# Patient Record
Sex: MALE | Race: WHITE | NOT HISPANIC OR LATINO | Employment: OTHER | URBAN - METROPOLITAN AREA
[De-identification: names, ages, dates, MRNs, and addresses within clinical notes are randomized per-mention and may not be internally consistent; named-entity substitution may affect disease eponyms.]

---

## 2017-01-03 ENCOUNTER — LAB CONVERSION - ENCOUNTER (OUTPATIENT)
Dept: OTHER | Facility: OTHER | Age: 52
End: 2017-01-03

## 2017-01-03 LAB
A/G RATIO (HISTORICAL): 1.6 (CALC) (ref 1–2.5)
ALBUMIN SERPL BCP-MCNC: 4.8 G/DL (ref 3.6–5.1)
ALP SERPL-CCNC: 76 U/L (ref 40–115)
ALT SERPL W P-5'-P-CCNC: 40 U/L (ref 9–46)
AST SERPL W P-5'-P-CCNC: 34 U/L (ref 10–35)
BILIRUB SERPL-MCNC: 0.5 MG/DL (ref 0.2–1.2)
BUN SERPL-MCNC: 11 MG/DL (ref 7–25)
BUN/CREA RATIO (HISTORICAL): ABNORMAL (CALC) (ref 6–22)
CALCIUM SERPL-MCNC: 9.7 MG/DL (ref 8.6–10.3)
CHLORIDE SERPL-SCNC: 99 MMOL/L (ref 98–110)
CHOLEST SERPL-MCNC: 224 MG/DL (ref 125–200)
CHOLEST/HDLC SERPL: 3.8 (CALC)
CO2 SERPL-SCNC: 28 MMOL/L (ref 20–31)
CREAT SERPL-MCNC: 0.82 MG/DL (ref 0.7–1.33)
DEPRECATED RDW RBC AUTO: 13.7 % (ref 11–15)
EGFR AFRICAN AMERICAN (HISTORICAL): 117 ML/MIN/1.73M2
EGFR-AMERICAN CALC (HISTORICAL): 101 ML/MIN/1.73M2
GAMMA GLOBULIN (HISTORICAL): 3 G/DL (CALC) (ref 1.9–3.7)
GLUCOSE (HISTORICAL): 100 MG/DL (ref 65–99)
HCT VFR BLD AUTO: 47 % (ref 38.5–50)
HDLC SERPL-MCNC: 59 MG/DL
HGB BLD-MCNC: 15.5 G/DL (ref 13.2–17.1)
HIV AG/AB, 4TH GEN (HISTORICAL): NORMAL
LDL CHOLESTEROL (HISTORICAL): 116 MG/DL (CALC)
MCH RBC QN AUTO: 31.6 PG (ref 27–33)
MCHC RBC AUTO-ENTMCNC: 32.9 G/DL (ref 32–36)
MCV RBC AUTO: 96 FL (ref 80–100)
NON-HDL-CHOL (CHOL-HDL) (HISTORICAL): 165 MG/DL (CALC)
PLATELET # BLD AUTO: 237 THOUSAND/UL (ref 140–400)
PMV BLD AUTO: 8.8 FL (ref 7.5–11.5)
POTASSIUM SERPL-SCNC: 4 MMOL/L (ref 3.5–5.3)
PROSTATE SPECIFIC ANTIGEN TOTAL (HISTORICAL): 0.3 NG/ML
RBC # BLD AUTO: 4.9 MILLION/UL (ref 4.2–5.8)
SODIUM SERPL-SCNC: 136 MMOL/L (ref 135–146)
T4 FREE SERPL-MCNC: 1 NG/DL (ref 0.8–1.8)
TOTAL PROTEIN (HISTORICAL): 7.8 G/DL (ref 6.1–8.1)
TRIGL SERPL-MCNC: 243 MG/DL
VIT B12 SERPL-MCNC: 727 PG/ML (ref 200–1100)
VITAMIN B1, WHOLE BLOOD (HISTORICAL): 124 NMOL/L (ref 78–185)
WBC # BLD AUTO: 7.3 THOUSAND/UL (ref 3.8–10.8)

## 2017-01-14 ENCOUNTER — ALLSCRIPTS OFFICE VISIT (OUTPATIENT)
Dept: OTHER | Facility: OTHER | Age: 52
End: 2017-01-14

## 2017-01-16 ENCOUNTER — GENERIC CONVERSION - ENCOUNTER (OUTPATIENT)
Dept: OTHER | Facility: OTHER | Age: 52
End: 2017-01-16

## 2017-01-23 ENCOUNTER — GENERIC CONVERSION - ENCOUNTER (OUTPATIENT)
Dept: OTHER | Facility: OTHER | Age: 52
End: 2017-01-23

## 2017-01-27 ENCOUNTER — ALLSCRIPTS OFFICE VISIT (OUTPATIENT)
Dept: PSYCHOLOGY | Facility: CLINIC | Age: 52
End: 2017-01-27

## 2017-02-28 ENCOUNTER — ALLSCRIPTS OFFICE VISIT (OUTPATIENT)
Dept: OTHER | Facility: OTHER | Age: 52
End: 2017-02-28

## 2017-02-28 DIAGNOSIS — M54.50 LOW BACK PAIN: ICD-10-CM

## 2017-04-05 ENCOUNTER — GENERIC CONVERSION - ENCOUNTER (OUTPATIENT)
Dept: OTHER | Facility: OTHER | Age: 52
End: 2017-04-05

## 2017-04-12 ENCOUNTER — ALLSCRIPTS OFFICE VISIT (OUTPATIENT)
Dept: OTHER | Facility: OTHER | Age: 52
End: 2017-04-12

## 2017-04-26 ENCOUNTER — GENERIC CONVERSION - ENCOUNTER (OUTPATIENT)
Dept: OTHER | Facility: OTHER | Age: 52
End: 2017-04-26

## 2017-05-03 ENCOUNTER — ALLSCRIPTS OFFICE VISIT (OUTPATIENT)
Dept: OTHER | Facility: OTHER | Age: 52
End: 2017-05-03

## 2017-06-16 ENCOUNTER — GENERIC CONVERSION - ENCOUNTER (OUTPATIENT)
Dept: OTHER | Facility: OTHER | Age: 52
End: 2017-06-16

## 2017-06-21 ENCOUNTER — ALLSCRIPTS OFFICE VISIT (OUTPATIENT)
Dept: OTHER | Facility: OTHER | Age: 52
End: 2017-06-21

## 2017-07-18 ENCOUNTER — ALLSCRIPTS OFFICE VISIT (OUTPATIENT)
Dept: OTHER | Facility: OTHER | Age: 52
End: 2017-07-18

## 2017-07-18 DIAGNOSIS — M25.562 PAIN IN LEFT KNEE: ICD-10-CM

## 2017-07-18 DIAGNOSIS — M25.511 PAIN IN RIGHT SHOULDER: ICD-10-CM
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
## 2017-10-09 ENCOUNTER — TRANSCRIBE ORDERS (OUTPATIENT)
Dept: ADMINISTRATIVE | Facility: HOSPITAL | Age: 52
End: 2017-10-09

## 2017-10-09 ENCOUNTER — GENERIC CONVERSION - ENCOUNTER (OUTPATIENT)
Dept: OTHER | Facility: OTHER | Age: 52
End: 2017-10-09

## 2017-10-09 ENCOUNTER — HOSPITAL ENCOUNTER (OUTPATIENT)
Dept: RADIOLOGY | Facility: HOSPITAL | Age: 52
Discharge: HOME | End: 2017-10-09
Payer: COMMERCIAL

## 2017-10-09 ENCOUNTER — APPOINTMENT (OUTPATIENT)
Dept: LAB | Facility: HOSPITAL | Age: 52
End: 2017-10-09
Payer: COMMERCIAL

## 2017-10-09 DIAGNOSIS — M25.562 PAIN IN LEFT KNEE: ICD-10-CM

## 2017-10-09 DIAGNOSIS — E55.9 AVITAMINOSIS D: ICD-10-CM
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
## 2017-10-09 DIAGNOSIS — E55.9 AVITAMINOSIS D: Primary | ICD-10-CM

## 2017-10-09 DIAGNOSIS — M25.511 PAIN IN RIGHT SHOULDER: ICD-10-CM

## 2017-10-09 LAB — 25(OH)D3 SERPL-MCNC: 37.3 NG/ML (ref 30–100)

## 2017-10-09 PROCEDURE — 73562 X-RAY EXAM OF KNEE 3: CPT

## 2017-10-09 PROCEDURE — 86617 LYME DISEASE ANTIBODY: CPT

## 2017-10-09 PROCEDURE — 82306 VITAMIN D 25 HYDROXY: CPT

## 2017-10-09 PROCEDURE — 73030 X-RAY EXAM OF SHOULDER: CPT

## 2017-10-09 PROCEDURE — 36415 COLL VENOUS BLD VENIPUNCTURE: CPT

## 2017-10-10 ENCOUNTER — GENERIC CONVERSION - ENCOUNTER (OUTPATIENT)
Dept: OTHER | Facility: OTHER | Age: 52
End: 2017-10-10

## 2017-10-10 LAB
B BURGDOR IGG PATRN SER IB-IMP: POSITIVE
B BURGDOR IGM PATRN SER IB-IMP: NEGATIVE
B BURGDOR18KD IGG SER QL IB: PRESENT
B BURGDOR23KD IGG SER QL IB: ABNORMAL
B BURGDOR23KD IGM SER QL IB: ABNORMAL
B BURGDOR28KD IGG SER QL IB: ABNORMAL
B BURGDOR30KD IGG SER QL IB: PRESENT
B BURGDOR39KD IGG SER QL IB: PRESENT
B BURGDOR39KD IGM SER QL IB: ABNORMAL
B BURGDOR41KD IGG SER QL IB: PRESENT
B BURGDOR41KD IGM SER QL IB: ABNORMAL
B BURGDOR45KD IGG SER QL IB: PRESENT
B BURGDOR58KD IGG SER QL IB: PRESENT
B BURGDOR66KD IGG SER QL IB: ABNORMAL
B BURGDOR93KD IGG SER QL IB: ABNORMAL

## 2017-10-17 ENCOUNTER — ALLSCRIPTS OFFICE VISIT (OUTPATIENT)
Dept: OTHER | Facility: OTHER | Age: 52
End: 2017-10-17

## 2017-10-24 RX ORDER — DOXYCYCLINE HYCLATE 100 MG/1
100 CAPSULE ORAL EVERY 12 HOURS SCHEDULED
COMMUNITY
End: 2018-06-04

## 2017-10-24 RX ORDER — MELATONIN
2000 DAILY
COMMUNITY
End: 2021-02-11 | Stop reason: SDUPTHER

## 2017-10-29 ENCOUNTER — ANESTHESIA EVENT (OUTPATIENT)
Dept: PERIOP | Facility: HOSPITAL | Age: 52
End: 2017-10-29
Payer: COMMERCIAL

## 2017-10-29 RX ORDER — SODIUM CHLORIDE, SODIUM LACTATE, POTASSIUM CHLORIDE, CALCIUM CHLORIDE 600; 310; 30; 20 MG/100ML; MG/100ML; MG/100ML; MG/100ML
50 INJECTION, SOLUTION INTRAVENOUS CONTINUOUS
Status: CANCELLED | OUTPATIENT
Start: 2017-10-29

## 2017-10-30 ENCOUNTER — GENERIC CONVERSION - ENCOUNTER (OUTPATIENT)
Dept: OTHER | Facility: OTHER | Age: 52
End: 2017-10-30

## 2017-10-30 ENCOUNTER — ANESTHESIA (OUTPATIENT)
Dept: PERIOP | Facility: HOSPITAL | Age: 52
End: 2017-10-30
Payer: COMMERCIAL

## 2017-10-30 ENCOUNTER — HOSPITAL ENCOUNTER (OUTPATIENT)
Facility: HOSPITAL | Age: 52
Setting detail: OUTPATIENT SURGERY
Discharge: HOME | End: 2017-10-30
Attending: INTERNAL MEDICINE | Admitting: INTERNAL MEDICINE
Payer: COMMERCIAL

## 2017-10-30 VITALS
SYSTOLIC BLOOD PRESSURE: 135 MMHG | HEIGHT: 71 IN | DIASTOLIC BLOOD PRESSURE: 67 MMHG | OXYGEN SATURATION: 98 % | WEIGHT: 197.53 LBS | HEART RATE: 52 BPM | BODY MASS INDEX: 27.65 KG/M2 | TEMPERATURE: 97.7 F | RESPIRATION RATE: 17 BRPM

## 2017-10-30 DIAGNOSIS — Z12.11 ENCOUNTER FOR SCREENING FOR MALIGNANT NEOPLASM OF COLON: ICD-10-CM

## 2017-10-30 PROCEDURE — 88305 TISSUE EXAM BY PATHOLOGIST: CPT | Performed by: INTERNAL MEDICINE

## 2017-10-30 RX ORDER — SODIUM CHLORIDE, SODIUM LACTATE, POTASSIUM CHLORIDE, CALCIUM CHLORIDE 600; 310; 30; 20 MG/100ML; MG/100ML; MG/100ML; MG/100ML
50 INJECTION, SOLUTION INTRAVENOUS CONTINUOUS
Status: DISCONTINUED | OUTPATIENT
Start: 2017-10-30 | End: 2017-10-30 | Stop reason: HOSPADM

## 2017-10-30 RX ORDER — BUPRENORPHINE AND NALOXONE 8; 2 MG/1; MG/1
1 FILM, SOLUBLE BUCCAL; SUBLINGUAL DAILY
COMMUNITY
End: 2018-06-04

## 2017-10-30 RX ORDER — PROPOFOL 10 MG/ML
INJECTION, EMULSION INTRAVENOUS AS NEEDED
Status: DISCONTINUED | OUTPATIENT
Start: 2017-10-30 | End: 2017-10-30 | Stop reason: SURG

## 2017-10-30 RX ADMIN — LIDOCAINE HYDROCHLORIDE 20 MG: 20 INJECTION, SOLUTION INTRAVENOUS at 10:20

## 2017-10-30 RX ADMIN — SODIUM CHLORIDE, SODIUM LACTATE, POTASSIUM CHLORIDE, AND CALCIUM CHLORIDE: .6; .31; .03; .02 INJECTION, SOLUTION INTRAVENOUS at 10:00

## 2017-10-30 RX ADMIN — PROPOFOL 50 MG: 10 INJECTION, EMULSION INTRAVENOUS at 10:23

## 2017-10-30 RX ADMIN — PROPOFOL 50 MG: 10 INJECTION, EMULSION INTRAVENOUS at 10:28

## 2017-10-30 RX ADMIN — PROPOFOL 50 MG: 10 INJECTION, EMULSION INTRAVENOUS at 10:21

## 2017-10-30 RX ADMIN — PROPOFOL 50 MG: 10 INJECTION, EMULSION INTRAVENOUS at 10:20

## 2017-10-30 RX ADMIN — PROPOFOL 50 MG: 10 INJECTION, EMULSION INTRAVENOUS at 10:25

## 2017-10-30 NOTE — ANESTHESIA POSTPROCEDURE EVALUATION
Post-Op Assessment Note      CV Status:  Stable    Mental Status:  Alert    Hydration Status:  Stable    PONV Controlled:  None    Airway Patency:  Patent    Post Op Vitals Reviewed: Yes          Staff: CRNA           BP   128/63   Temp   98 0   Pulse  54   Resp   15   SpO2   97

## 2017-10-30 NOTE — OP NOTE
**** GI/ENDOSCOPY REPORT ****     PATIENT NAME: Sarahi Young ------ VISIT ID:  Patient ID: [id number]   YOB: 1965     INTRODUCTION: Colonoscopy - A 45 male patient presents for an outpatient   Colonoscopy at [facility]  PREVIOUS COLONOSCOPY:     INDICATIONS: Average-risk screening for colon cancer  CONSENT:  The benefits, risks, and alternatives to the procedure were   discussed and informed consent was obtained from the patient  PREPARATION: EKG, pulse, pulse oximetry and blood pressure were monitored   throughout the procedure  The patient was identified by myself both   verbally and by visual inspection of ID band  Airway Assessment   Classification: Airway class 2 - Visualization of the soft palate, fauces   and uvula  ASA Classification: Class 2 - Patient has mild to moderate   systemic disturbance that may or may not be related to the disorder   requiring surgery  The patient was kept NPO for eight hours prior to the   procedure  The patient received Nulytely in preparation for the procedure  MEDICATIONS: Anesthesia-check records MAC anesthesia  PROCEDURE:  The endoscope was passed without difficulty through the anus   under direct visualization and advanced to the cecum, confirmed by   appendiceal orifice and ileocecal valve  The scope was withdrawn and the   mucosa was carefully examined  The quality of the preparation was good  Cecal Intubation Time: 2 minutes(s) Scope Withdrawal Time: 7 minutes(s)     RECTAL EXAM: Normal rectal exam      FINDINGS:  Two sessile polyps, measuring between 5 and 10 mm in size, were   found in the splenic flexure and rectum  All polyps were completely   removed by cold snare polypectomy  The polyps were retrieved  The cecum,   ascending colon, hepatic flexure, transverse colon, descending colon,   sigmoid colon, and rectosigmoid junction appeared to be normal  On   retroflexed view, internal hemorrhoids were found  COMPLICATIONS: There were no complications  IMPRESSIONS: Two sessile polyps found in the splenic flexure and rectum;   all polyps removed by cold snare polypectomy  Normal cecum, ascending   colon, hepatic flexure, transverse colon, descending colon, sigmoid colon,   and rectosigmoid junction  Internal hemorrhoids  RECOMMENDATIONS: Colonoscopy recommended in 3 years  Follow-up on the   results of the biopsy specimens  ESTIMATED BLOOD LOSS: None  PATHOLOGY SPECIMENS: All polyps completely removed by cold snare   polypectomy  Yes     PROCEDURE CODES: : Colonoscopy with snare polypectomy     ICD-9 Codes: V76 51 Special screening for malignant neoplasms of colon   211 4 Benign neoplasm of rectum and anal canal 211 3 Benign neoplasm of   colon     ICD-10 Codes: Z12 11 Encounter for screening for malignant neoplasm of   colon K63 5 Polyp of colon K64 9 Unspecified hemorrhoids     PERFORMED BY: ATIF Posada  on 10/30/2017  Version 1, electronically signed by ATIF Dimas  on   10/30/2017 at 10:34

## 2017-10-30 NOTE — ANESTHESIA PREPROCEDURE EVALUATION
Review of Systems/Medical History  Patient summary reviewed  Chart reviewed  No history of anesthetic complications     Cardiovascular  Negative cardio ROS Exercise tolerance: good,     Pulmonary  Smoker cigarette smoker more than 10 packs per year , ,        GI/Hepatic  Negative GI/hepatic ROS          Negative  ROS        Endo/Other  Negative endo/other ROS      GYN  Negative gynecology ROS          Hematology  Negative hematology ROS      Musculoskeletal  Negative musculoskeletal ROS        Neurology  Negative neurology ROS      Psychology     Chronic opioid dependence (Suboxone maintenance)         Physical Exam    Airway    Mallampati score: I  TM Distance: >3 FB  Neck ROM: full     Dental   No notable dental hx     Cardiovascular  Comment: Negative ROS,     Pulmonary      Other Findings        Anesthesia Plan  ASA Score- 2       Anesthesia Type- IV sedation with anesthesia with ASA Monitors  Additional Monitors:   Airway Plan:           Induction- intravenous  Informed Consent- Anesthetic plan and risks discussed with patient  I personally reviewed this patient with the CRNA  Discussed and agreed on the Anesthesia Plan with the CRNA  Sumanth Rhoades

## 2017-10-31 NOTE — PROGRESS NOTES
Assessment  1  Lyme disease (408 81) (A69 20)    Plan  Lyme disease    · Doxycycline Hyclate 100 MG Oral Tablet; TAKE 1 TABLET EVERY 12 HOURS DAILY    Discussion/Summary  Discussion Summary:   Start antibiotics  Medication SE Review and Pt Understands Tx: Possible side effects of new medications were reviewed with the patient/guardian today  The treatment plan was reviewed with the patient/guardian  The patient/guardian understands and agrees with the treatment plan      Chief Complaint  Chief Complaint Free Text Note Form: The patient presents today to discuss positive results of Lyme blot      History of Present Illness  HPI: patient has positive lyme testbeen bitten a couple times by ticks this yearrash      Review of Systems  Complete-Male:   Constitutional: no fever,-- not feeling poorly,-- no chills-- and-- not feeling tired  Eyes: no eye pain,-- no eyesight problems,-- eyes not red-- and-- no purulent discharge from the eyes  ENT: no earache,-- no nosebleeds,-- no hearing loss-- and-- no nasal discharge  Cardiovascular: the heart rate was not slow,-- no chest pain,-- the heart rate was not fast-- and-- no palpitations  Respiratory: no shortness of breath,-- no cough,-- no wheezing-- and-- no shortness of breath during exertion  Gastrointestinal: no abdominal pain,-- no nausea,-- no constipation-- and-- no diarrhea  Genitourinary: no dysuria  Musculoskeletal: no arthralgias,-- no joint swelling,-- no myalgias-- and-- no joint stiffness  Integumentary: no rashes,-- no itching,-- no skin lesions-- and-- no skin wound  Neurological: no headache,-- no numbness,-- no confusion-- and-- no dizziness  Psychiatric: no anxiety,-- no sleep disturbances-- and-- no depression  Active Problems  1  Anxiety (300 00) (F41 9)   2  Backache (724 5) (M54 9)   3  Chronic low back pain (724 2,338 29) (M54 5,G89 29)   4  Colon cancer screening (V76 51) (Z12 11)   5  Depression (311) (F32 9)   6   Encounter for prostate cancer screening (V76 44) (Z12 5)   7  Failed back syndrome, lumbar (722 83) (M96 1)   8  Fatigue (780 79) (R53 83)   9  Left knee pain (719 46) (M25 562)   10  Leg Weakness (728 87)   11  Limb pain (729 5) (M79 609)   12  Lumbago With Sciatica (724 3)   13  Lumbar facet arthropathy (721 3) (M12 88)   14  Major depression (296 20) (F32 9)   15  Need for influenza vaccination (V04 81) (Z23)   16  Numbness and tingling of both legs (782 0) (R20 0,R20 2)   17  Numbness Of The Right Leg (782 0)   18  Postlaminectomy syndrome, lumbar (722 83) (M96 1)   19  Postoperative examination (V67 00) (Z09)   20  Prescription drug abuse (305 90)   21  Puncture (879 8) (T14 8XXA)   22  Right shoulder pain (719 41) (M25 511)   23  Screen for colon cancer (V76 51) (Z12 11)   24  Tick bite (919 4,E906 4) (W57 XXXA)   25  Tingling (782 0) (R20 2)   26  Uncomplicated opioid dependence (304 00) (F11 20)   27  Urinary hesitancy (788 64) (R39 11)   28  Vitamin D deficiency (268 9) (E55 9)    Past Medical History  1  History of Arthritis (716 90) (M19 90)   2  History of Cat Bite (E906 3)   3  History of Currently Wearing Eyeglasses   4  History of cardiac murmur (V12 59) (Z86 79)   5  History of concussion (V15 52) (Z87 820)   6  Denied: History of Seizure  Active Problems And Past Medical History Reviewed: The active problems and past medical history were reviewed and updated today  Surgical History  1  History of Back Surgery   2  History of Lower Back Surgery   3  History of Reported Prior Surgical / Procedural History  Surgical History Reviewed: The surgical history was reviewed and updated today  Family History  Mother    1  Family history of Anxiety (Symptom)   2  Family history of Back disorder   3  Family history of Depression   4  Family history of Emphysema  Father    5  Family history of Acute Myocardial Infarction (V17 3)   6  Family history of Back disorder   7   Family history of Diabetes Mellitus (V18 0)   8  Family history of Hypertension (V17 49)  Sister    5  Family history of Diabetes Mellitus (V18 0)   10  Family history of Diabetes Mellitus (V18 0)   11  Family history of Family Health Status 6  Children Living   12  Family history of Family Health Status Of Sister - Alive   15  Family history of Family Health Status Of Sister - Alive  Family History    14  Family history of Family Health Status Of Father - Alive   13  Family history of Family Health Status Of Mother - Alive   12  Family history of Maternal Grandfather Is    16  Family history of Maternal Grandmother Is    25  Family history of Paternal Grandfather Is    23  Family history of Paternal Grandmother Is   Family History Reviewed: The family history was reviewed and updated today  Social History   · Always uses seat belt   · Being A Social Drinker   · Daily caffeine consumption   · Denied: History of Drug Use   · Employed   · Full-time employment   · Graduated from high school   · Lives with spouse   · Living Independently With Spouse   · Living With And Caring For Child (V61 49)   · Denied: History of Marijuana   · Marital History - Currently    ·    · No drug use   · Occupation:   · Prescription drug abuse (305 90)   · Sexually Active   · Denied: History of Sexually Active High-risk   · Smoker (305 1) (F17 200)  Social History Reviewed: The social history was reviewed and updated today  The social history was reviewed and is unchanged  Current Meds   1  Vitamin D3 2000 UNIT Oral Capsule; TAKE 1 CAPSULE Every morning; Therapy: 15LZT8346 to (Evaluate:71Sfv4108) Recorded  Medication List Reviewed: The medication list was reviewed and updated today  Allergies  1  No Known Drug Allergies  Denied    2   Anesthesia IV Set MISC    Vitals  Vital Signs    Recorded: 01GNI0718 08:39AM   Temperature 98 3 F, Oral   Heart Rate 66   Respiration 16   Systolic 754   Diastolic 74   Weight 200 lb 0 4 oz   BMI Calculated 27 9   BSA Calculated 2 11   O2 Saturation 99     Physical Exam    Constitutional   General appearance: No acute distress, well appearing and well nourished  Eyes   Conjunctiva and lids: No swelling, erythema, or discharge  Pupils and irises: Equal, round and reactive to light  Ears, Nose, Mouth, and Throat   External inspection of ears and nose: Normal     Otoscopic examination: Tympanic membrance translucent with normal light reflex  Canals patent without erythema  Nasal mucosa, septum, and turbinates: Normal without edema or erythema  Oropharynx: Normal with no erythema, edema, exudate or lesions  Pulmonary   Respiratory effort: No increased work of breathing or signs of respiratory distress  Auscultation of lungs: Clear to auscultation, equal breath sounds bilaterally, no wheezes, no rales, no rhonci  Cardiovascular   Palpation of heart: Normal PMI, no thrills  Auscultation of heart: Normal rate and rhythm, normal S1 and S2, without murmurs  Examination of extremities for edema and/or varicosities: Normal     Carotid pulses: Normal     Abdomen   Abdomen: Non-tender, no masses  Liver and spleen: No hepatomegaly or splenomegaly  Lymphatic   Palpation of lymph nodes in neck: No lymphadenopathy  Musculoskeletal   Gait and station: Normal     Digits and nails: Normal without clubbing or cyanosis  Inspection/palpation of joints, bones, and muscles: Normal     Skin   Skin and subcutaneous tissue: Normal without rashes or lesions  Neurologic   Cranial nerves: Cranial nerves 2-12 intact  Reflexes: 2+ and symmetric  Sensation: No sensory loss      Psychiatric   Orientation to person, place and time: Normal     Mood and affect: Normal          Future Appointments    Date/Time Provider Specialty Site   10/30/2017 10:15 AM Seema Acosta MD Gastroenterology Adult [address]   01/19/2018 08:30 AM Maribel Hernandze DO Internal Medicine [facility]     Signatures   Electronically signed by : Louie Lee; Oct 29 2017  3:59PM EST                       (Author)    Electronically signed by : ATIF Valle ; Oct 30 2017  8:59AM EST                       (Review)

## 2017-11-07 ENCOUNTER — GENERIC CONVERSION - ENCOUNTER (OUTPATIENT)
Dept: OTHER | Facility: OTHER | Age: 52
End: 2017-11-07

## 2018-01-09 NOTE — PSYCH
History of Present Illness  Psychotherapy Provided [facility]: Individual Psychotherapy 25 minutes minutes provided today  Goals addressed in session:   Patient had initially noticed good response to progress- has more energy and motivation and felt like getting out of bed  has leveled  These symptoms have returned and he is again feeling frustrated defeated  Patient verbal and cooperative  HPI - Psych: Patient continues to detail issues with lack of energy, motivation and enjoyment  Appetite variable  Sleep variable  Doesn't feel rested despite sleeping more  Has affected interactions in his workplaces and at home  Denies any SI and contracts for safety due to wife and his children   Note   Note:   Provided supportive therapy  reviewed depression in males and began reviewing coping skills to better manage symptoms  Will speak with PCP about his initial response to Prozac and recent diminished gains from current dose  Provided my contact information to patient  Assessment    1   Major depression (296 20) (F32 9)    Signatures   Electronically signed by : Vernon Arvizu LCSW; Dec 28 2016 12:02PM EST                       (Author)

## 2018-01-10 NOTE — PSYCH
History of Present Illness  Psychotherapy Provided [facility]: Individual Psychotherapy 25 minutes minutes provided today  Goals addressed in session:   Patient doing well  No opiate or marijuana use  Remains in treatment with Dr Isela Delgado, who is prescribing Suboxone  Some social beer use that she would like him to stop  Patient stable from mood and anxiety issue Thing better at home with family as a result  Patient, pleasant, verbal and cooperative  HPI - Psych: Patient stable from mood and anxiety standpoint  Dolly to work full-time and more engaged with family and additional social supports  Not isolative and sedentary  Past behavior/drug abuse has created stress in marriage that still persists  have offered counseling to address any of these issues  Denies any depressive symptoms   Note   Note:   Provided supportive therapy  Reviewed stress mgmt strategies to employ in response to any stressors  Will meet with him monthly to monitor mood  Assessment    1   Depression (311) (F32 9)    Signatures   Electronically signed by : Trinity Arauz LCSW; May  3 2017 12:05PM EST                       (Author)

## 2018-01-10 NOTE — RESULT NOTES
Message   Schedule the pt tomorrow with me the lyme test came back positive-  I will need to start him on antibiotic ; have pt come in for the 11:00 A  M  Evaristo Shea with me        Verified Results  (1) LYME ANTIBODY, WESTERN BLOT [id number] 09:47AM Meli Gomez    Order Number: [id number]     Test Name Result Flag Reference   LYME 18 KD IGG Present A    LYME 23 KD IGG Absent     LYME 28 KD IGG Absent     LYME 30 KD IGG Present A    LYME 39 KD IGG Present A    LYME 41 KD IGG Present A    LYME 45 KD IGG Present A    LYME 58 KD IGG Present A    LYME 66 KD IGG Absent     LYME 93 KD IGG Absent     LYME 23 KD IGM Absent     LYME 39 KD IGM Absent     LYME 41 KD IGM Absent     LYME IGG WB INTERP  Positive A    Positive: 5 of the following                                 Borrelia-specific bands:                                 18,23,28,30,39,41,45,58,                                 66, and 93  Negative: No bands or banding                                 patterns which do not                                 meet positive criteria  LYME IGM WB INTERP  Negative     Note: An equivocal or positive EIA result followed by a negative  Western Blot result is considered NEGATIVE  An equivocal or positive  EIA result followed by a positive Western Blot is considered POSITIVE  by the CDC  Positive: 2 of the following bands: 23,39 or 41  Negative: No bands or banding patterns which do not meet positive  criteria    Criteria for positivity are those recommended by CDC/ASTPHLD   p23=Osp C, u02=eadulbnfy  Note:  Sera from individuals with the following may cross react in the  Lyme Western Blot assays: other spirochetal diseases (periodontal  disease, leptospirosis, relapsing fever, yaws, and pinta);  connective autoimmune (Rheumatoid Arthritis and Systemic Lupus  Erythematosus and also individuals with Antinuclear Antibody);  other infections COFFEE [facility] Spotted Fever; Ben-Barr Virus,  and Cytomegalovirus)      Performed at:  [facility] [employer] [address]  [id number]  : Trey Laureano MD, Phone:  [phone]

## 2018-01-11 NOTE — PSYCH
History of Present Illness  [facility] Clinical Progress Note [facility]:   Specialized Services Documentation - Therapist must complete separate progress note for each specific clinical activity in which the client participated during the day  Case Management Note:   [id number] Met with Horacio Iglesias and his wife Rayray Sanz  Reviewed recommendation for drug and alcohol treatment  They are agreeable  Intake scheduled with [facility] tomorrow  Releases signed for [facility] and PCP who referred to us  Juanita Gallagher tasked related to recommendation  Phone call placed to Dr Cullen Martinez - does prescribe Suboxone but no new patient appointments until May  List of psychiatrists in network near his home given  Medication changes reviewed  Name of Dr Danielle Moore also given (who prescribes Suboxone) - Horacio Iglesias had requested this information  Medications changed/added/denied:  Scottie Bumpers, MT-BC      Active Problems    1  Backache (724 5) (M54 9)   2  Encounter for prostate cancer screening (V76 44) (Z12 5)   3  Failed back syndrome, lumbar (722 83) (M96 1)   4  Fatigue (780 79) (R53 83)   5  Leg Weakness (728 87)   6  Limb pain (729 5) (M79 609)   7  Lumbago With Sciatica (724 3)   8  Lumbar facet arthropathy (721 3) (M12 88)   9  Major depression (296 20) (F32 9)   10  Need for influenza vaccination (V04 81) (Z23)   11  Numbness and tingling of both legs (782 0) (R20 2)   12  Numbness Of The Right Leg (782 0)   13  Postlaminectomy syndrome, lumbar (722 83) (M96 1)   14  Postoperative examination (V67 00) (Z09)   15  Prescription drug abuse (305 90)   16  Screen for colon cancer (V76 51) (Z12 11)   17  Tingling (782 0) (R20 2)   18  Urinary hesitancy (788 64) (R39 11)   19  Vitamin D deficiency (268 9) (E55 9)    Past Medical History    1  History of Arthritis (716 90) (M19 90)   2  History of Cat Bite (E906 3)   3  History of Currently Wearing Eyeglasses   4   History of cardiac murmur (V12 59) (Z86 79)    Allergies    1  No Known Drug Allergies  Denied    2  Anesthesia IV Set MISC    Current Meds   1  FLUoxetine HCl - 20 MG Oral Capsule; take 1 capsule by mouth once daily; Therapy: 63FZU5434 to (Evaluate:52Lkh2057)  Requested for: 80XJU1370; Last   Rx:2017 Ordered   2  Vitamin D3 2000 UNIT Oral Capsule; TAKE 1 CAPSULE Every morning; Therapy: 58BMU8523 to (Evaluate:77Cxp6265) Recorded    Family Psych History  Mother    1  Family history of Anxiety (Symptom)   2  Family history of Back disorder   3  Family history of Depression   4  Family history of Emphysema  Father    5  Family history of Acute Myocardial Infarction (V17 3)   6  Family history of Back disorder   7  Family history of Diabetes Mellitus (V18 0)   8  Family history of Hypertension (V17 49)  Sister    5  Family history of Diabetes Mellitus (V18 0)   10  Family history of Diabetes Mellitus (V18 0)   11  Family history of Family Health Status 6  Children Living   12  Family history of Family Health Status Of Sister - Alive   15  Family history of Family Health Status Of Sister - Alive  Family History    14  Family history of Family Health Status Of Father - Alive   13  Family history of Family Health Status Of Mother - Alive   12  Family history of Maternal Grandfather Is    16  Family history of Maternal Grandmother Is    25  Family history of Paternal Grandfather Is    23   Family history of Paternal Grandmother Is     Social History    · Always uses seat belt   · Being A Social Drinker   · Denied: History of Drug Use   · Full-time employment   · Lives with spouse   · Living Independently With Spouse   · Living With And Caring For Child (V61 49)   · Denied: History of Marijuana   · Marital History - Currently    ·    · No drug use   · Occupation:   · Prescription drug abuse (305 90)   · Sexually Active   · Denied: History of Sexually Active High-risk   · Smoker (305 1) (F17 200)    Future Appointments    Date/Time Provider Specialty Site   02/28/2017 09:00 AM Ana Martinez DO Internal Medicine [facility]     Signatures   Electronically signed by : SHIVA Galo; Jan 23 2017 12:01PM EST                       (Author)

## 2018-01-11 NOTE — MISCELLANEOUS
Provider Comments  Provider Comments:   Pt was a n/s  LMOM to call back and make new apt        Signatures   Electronically signed by : Saira Aguero DO; Jun 16 2017  5:36PM EST                       (Author)

## 2018-01-12 VITALS
HEIGHT: 71 IN | SYSTOLIC BLOOD PRESSURE: 142 MMHG | DIASTOLIC BLOOD PRESSURE: 86 MMHG | HEART RATE: 79 BPM | WEIGHT: 226.03 LBS | BODY MASS INDEX: 31.65 KG/M2 | OXYGEN SATURATION: 98 % | TEMPERATURE: 98.5 F | RESPIRATION RATE: 18 BRPM

## 2018-01-12 NOTE — RESULT NOTES
Message   Notify the patient x-ray of the right shoulder does show mild to moderate arthritic changes of the acromioclavicular joint please have the patient see orthopedics Dr Laya Mcfarlane and follow up as scheduled   Notify the patient the x-ray of the left knee no acute  abnormalities follow-up as scheduled        Verified Results  * XR SHOULDER 2+ VIEW RIGHT [id number] [facility]: [id number]     Test Name Result Flag Reference   XR SHOULDER 2+ VW RIGHT (Report)     RIGHT SHOULDER     INDICATION: Right shoulder pain  COMPARISON: None     VIEWS: 3     IMAGES: 3     FINDINGS:     There is no acute fracture or dislocation  There are degenerative changes of the acromioclavicular joint  No lytic or blastic lesions are seen  Soft tissues are unremarkable  IMPRESSION:     No acute osseous abnormality  Mild to moderate arthritic changes of the acromioclavicular joint  Workstation performed: [id number]     Signed by:   Dany Boyd MD   10/9/17     * XR KNEE 3 VW LEFT NON INJURY [id number] [facility]: [id number]     Test Name Result Flag Reference   XR KNEE 3 VW LEFT (Report)     LEFT KNEE     INDICATION: Left knee pain  COMPARISON: None     VIEWS: 3     IMAGES: 3     FINDINGS:     There is no acute fracture or dislocation  There is no joint effusion  No degenerative changes  No lytic or blastic lesions are seen  Soft tissues are unremarkable  IMPRESSION:     No acute osseous abnormality         Workstation performed: [id number]     Signed by:   Dany Boyd MD   10/9/17       Signatures   Electronically signed by : Trudi Prader, DO; Oct  9 2017  7:59PM EST                       (Author)

## 2018-01-13 VITALS
HEART RATE: 66 BPM | OXYGEN SATURATION: 99 % | BODY MASS INDEX: 27.9 KG/M2 | DIASTOLIC BLOOD PRESSURE: 74 MMHG | RESPIRATION RATE: 16 BRPM | SYSTOLIC BLOOD PRESSURE: 120 MMHG | TEMPERATURE: 98.3 F | WEIGHT: 200.03 LBS

## 2018-01-13 VITALS
HEART RATE: 56 BPM | WEIGHT: 206.04 LBS | BODY MASS INDEX: 28.85 KG/M2 | OXYGEN SATURATION: 98 % | HEIGHT: 71 IN | SYSTOLIC BLOOD PRESSURE: 138 MMHG | DIASTOLIC BLOOD PRESSURE: 90 MMHG | RESPIRATION RATE: 16 BRPM

## 2018-01-13 VITALS
BODY MASS INDEX: 32.07 KG/M2 | DIASTOLIC BLOOD PRESSURE: 80 MMHG | TEMPERATURE: 98.6 F | OXYGEN SATURATION: 97 % | HEART RATE: 61 BPM | RESPIRATION RATE: 16 BRPM | HEIGHT: 70 IN | WEIGHT: 224 LBS | SYSTOLIC BLOOD PRESSURE: 122 MMHG

## 2018-01-13 NOTE — PSYCH
History of Present Illness  Psychotherapy Provided [facility]: Individual Psychotherapy 25 minutes minutes provided today  Goals addressed in session:   Patient denies any acute issues  Mood has been stable  Remains in treatment and on Suboxone  Continues o be clean of street drugs  Patient pleasant, verbal and cooperative  HPI - Psych: Patient denies any depressive symptoms  Denies any acute anxiety issues  Deals with multiple family stressors as well as some financial stressors stemming from his past drug abuse but he has worked hard to get this back on track  No SI   Note   Note:   Reviewed stress mgmt strategies to continue to utilize  Recognizes need to stay clean as he primary re of focus  Will meet with him on a PRN basis  Assessment    1  Depression (311) (F32 9)   2   Anxiety (300 00) (F41 9)    Signatures   Electronically signed by : Roly Jamil LCSW; Jun 21 2017 10:34AM EST                       (Author)

## 2018-01-13 NOTE — RESULT NOTES
Verified Results  (1) TISSUE EXAM [id number] 10:28AM Bontheresa Hallmark     Test Name Result Flag Reference   LAB AP CASE REPORT (Report)     Surgical Pathology Report             Case: [id number]                   Authorizing Provider: Laure Sultana MD  Collected:      10/30/2017 1028        Ordering Location:   [address] [place] [address] Received:      10/30/2017 1159                    Operating Room                                 Pathologist:      Freddy Estrella MD                             Specimen:  Polyp, Colorectal, Cold snare polyp splenic flexure   LAB AP FINAL DIAGNOSIS      A  Colon, splenic flexure polyp, biopsy:   - Tubular adenoma, negative for high-grade dysplasia  Electronically signed by Freddy Estrella MD on 11/6/2017 at 11:04 AM   LAB AP SURGICAL ADDITIONAL INFORMATION (Report)     All controls performed with the immunohistochemical stains reported above   reacted appropriately  These tests were developed and their performance   characteristics determined by [facility] or   [place]  They may not be cleared or approved by the [place]  Food and Drug Administration  The FDA has determined that such clearance   or approval is not necessary  These tests are used for clinical purposes  They should not be regarded as investigational or for research  This   laboratory has been approved by CLIA 88, designated as a high-complexity   laboratory and is qualified to perform these tests  Interpretation performed at , [address]    LAB AP GROSS DESCRIPTION (Report)     A  The specimen is received in formalin, labeled with the patient's name   and hospital number, and is designated Polyp splenic flexure  The   specimen consists of 2 tan soft tissue fragments measuring 0 2 and 0 4 cm  Entirely submitted  One cassette          Note: The estimated total formalin fixation time based upon information   provided by the submitting clinician and the standard processing schedule   is less than 72 hours      MAC
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
## 2018-01-16 NOTE — MISCELLANEOUS
Provider Comments  Provider Comments:   Patient no-showed for appt with me today at 9:30AM       Signatures   Electronically signed by : Faith Shepard LCSW; Dec 14 2016  9:54AM EST                       (Author)

## 2018-01-16 NOTE — MISCELLANEOUS
Provider Comments  Provider Comments:   Patient no-showed for an appt with me today at 1:00PM       Signatures   Electronically signed by : Madelaine Gomez LCSW; Apr 5 2017  1:21PM EST                       (Author)

## 2018-01-16 NOTE — MISCELLANEOUS
Message  GI Reminder Recall Chani Gonzalez:   Date: 12/12/2016   Dear Trinidad Malin:     Review of our records shows you are due for the following: Colonoscopy  Please call the following office to schedule your appointment:   [address] [facility], [place], [place] [address] [phone]  We look forward to hearing from you!      Sincerely,     [facility]      Signatures   Electronically signed by : Graciela Hahn, ; Dec 12 2016  1:40PM EST                       (Author)

## 2018-01-17 NOTE — MISCELLANEOUS
Provider Comments  Provider Comments:   Patient no-showed for appt today        Signatures   Electronically signed by : Fernando Peck LCSW; Apr 26 2017 11:41AM EST                       (Author)

## 2018-01-18 NOTE — RESULT NOTES
Message   Notify the patient normal vitamin D level follow-up as scheduled        Verified Results  (1) VITAMIN D 25-HYDROXY [id number] 09:47AM Mamta Abarca     Test Name Result Flag Reference   VIT D 25-HYDROX 37 3 ng/mL  30 0-100 0   This assay is a certified procedure of the CDC Vitamin D Standardization Certification Program (VDSCP)     Deficiency <20ng/ml   Insufficiency 20-30ng/ml   Sufficient  ng/ml     *Patients undergoing fluorescein dye angiography may retain small amounts of fluorescein in the body for 48-72 hours post procedure  Samples containing fluorescein can produce falsely elevated Vitamin D values  If the patient had this procedure, a specimen should be resubmitted post fluorescein clearance

## 2018-01-19 ENCOUNTER — ALLSCRIPTS OFFICE VISIT (OUTPATIENT)
Dept: OTHER | Facility: OTHER | Age: 53
End: 2018-01-19

## 2018-01-20 NOTE — PROGRESS NOTES
Assessment   1  Alcohol withdrawal (291 81) (F10 239)   2  Alcohol abuse (305 00) (F10 10)   3  Vitamin D deficiency (268 9) (E55 9)   4  Hypertriglyceridemia (272 1) (E78 1)   5  Prediabetes (790 29) (R73 03)   6  Encounter for preventive health examination (V70 0) (Z00 00)      Assessment and plan 1  Alcohol abuse/alcohol withdrawal no seizure activity no Dt's patient will be entering female for clinic later today he is currently working with  [employer]  2   Vitamin-D deficiency check a vitamin-D level 3  Hypertriglyceridemia reduce sweets will check lipid profile 4  Prediabetes check hemoglobin A1c past complex sugar reduce carbohydrates sweets discontinue number health maintenance will check a screening PSA patient declines a flu vaccine return to office  2 months  call if any problems       Plan   Health Maintenance    · (1) PSA (SCREEN) (Dx V76 44 Screen for Prostate Cancer); Status:Active; Requested    for:19Apr2018;   Prediabetes    · (1) COMPREHENSIVE METABOLIC PANEL; Status:Active; Requested for:19Apr2018;    · (1) HEMOGLOBIN A1C; Status:Active; Requested BAT:[id number];    · (1) LIPID PANEL, FASTING; Status:Active; Requested for:19Apr2018;   Vitamin D deficiency    · *(Q) VITAMIN D, 25-HYDROXY, LC/MS/MS; Status:Active; Requested UJC:[id number];     Chief Complaint   Chief Complaint Chronic Condition [facility] Kitchen: Patient is here today for follow up of chronic conditions described in HPI  History of Present Illness   HPI: [age]-year old male coming in for a follow up visit regarding Lyme disease, anxiety/depression, prescription drug use/failed back syndrome; patient reports me that he will be entering the [facility] clinic later today for alcoholism and alcohol abuse he reports me over the last year he has been drinking half a gal of liquor and 20 beers per day which has been escalating in addition he has been using the Suboxone using 2 tablets a day instead of 1 and tablets per day  At this point in time he has come to the realization that he needs rehab and he wants to get better he is not taking Suboxone in the last week and he has not drank alcohol in 3 days he has not had any seizure activity but he feels as if he is going through withdrawal he reports me that he has discussed this with Counsellor Hanna Rosales and the patient is currently in the process of getting into [facility] clinic later today  There is a bed available  The patient reports me he completed the 21 days of doxycycline for Lyme disease he reports me he is following healthy imbalance diet sedentary secondary to the alcoholism  He reports me feeling depressed but not suicidal he reports me that he is depressed because of the withdrawal symptoms  Review of Systems   Complete-Male:      Constitutional: No fever or chills, feels well, no tiredness, no recent weight gain or weight loss  Cardiovascular: No complaints of slow heart rate, no fast heart rate, no chest pain, no palpitations, no leg claudication, no lower extremity  Respiratory: No complaints of shortness of breath, no wheezing, no cough, no SOB on exertion, no orthopnea or PND  Gastrointestinal: No complaints of abdominal pain, no constipation, no nausea or vomiting, no diarrhea or bloody stools  Genitourinary: No complaints of dysuria, no incontinence, no hesitancy, no nocturia, no genital lesion, no testicular pain  ROS Reviewed:    ROS reviewed  Active Problems   1  Anxiety (300 00) (F41 9)   2  Backache (724 5) (M54 9)   3  Chronic low back pain (724 2,338 29) (M54 5,G89 29)   4  Colon cancer screening (V76 51) (Z12 11)   5  Depression (311) (F32 9)   6  Encounter for prostate cancer screening (V76 44) (Z12 5)   7  Failed back syndrome, lumbar (722 83) (M96 1)   8  Fatigue (780 79) (R53 83)   9  Left knee pain (719 46) (M25 562)   10  Leg Weakness (728 87)   11  Limb pain (729 5) (M79 609)   12   Lumbago With Sciatica (724 3) 13  Lumbar facet arthropathy (721 3) (M46 96)   14  Lyme disease (088 81) (A69 20)   15  Major depression (296 20) (F32 9)   16  Need for influenza vaccination (V04 81) (Z23)   17  Numbness and tingling of both legs (782 0) (R20 0,R20 2)   18  Numbness Of The Right Leg (782 0)   19  Postlaminectomy syndrome, lumbar (722 83) (M96 1)   20  Postoperative examination (V67 00) (Z09)   21  Prescription drug abuse (305 90)   22  Puncture (879 8) (T14 8XXA)   23  Right shoulder pain (719 41) (M25 511)   24  Screen for colon cancer (V76 51) (Z12 11)   25  Tick bite (919 4,E906 4) (W57 XXXA)   26  Tingling (782 0) (R20 2)   27  Uncomplicated opioid dependence (304 00) (F11 20)   28  Urinary hesitancy (788 64) (R39 11)   29  Vitamin D deficiency (268 9) (E55 9)    Past Medical History   1  History of Arthritis (716 90) (M19 90)   2  History of Cat Bite (E906 3)   3  History of Currently Wearing Eyeglasses   4  History of cardiac murmur (V12 59) (Z86 79)   5  History of concussion (V15 52) (Z87 820)   6  Denied: History of Seizure  Active Problems And Past Medical History Reviewed: The active problems and past medical history were reviewed and updated today  Surgical History   1  History of Back Surgery   2  History of Lower Back Surgery   3  History of Reported Prior Surgical / Procedural History  Surgical History Reviewed: The surgical history was reviewed and updated today  Family History   Mother    1  Family history of Anxiety (Symptom)   2  Family history of Back disorder   3  Family history of Depression   4  Family history of Emphysema  Father    5  Family history of Acute Myocardial Infarction (V17 3)   6  Family history of Back disorder   7  Family history of Diabetes Mellitus (V18 0)   8  Family history of Hypertension (V17 49)  Sister    5  Family history of Diabetes Mellitus (V18 0)   10  Family history of Diabetes Mellitus (V18 0)   11   Family history of Family Health Status 6  Children Living 15  Family history of Family Health Status Of Sister - Alive   15  Family history of Family Health Status Of Sister - Alive  Family History    14  Family history of Family Health Status Of Father - Alive   13  Family history of Family Health Status Of Mother - Alive   12  Family history of Maternal Grandfather Is    16  Family history of Maternal Grandmother Is    25  Family history of Paternal Grandfather Is    23  Family history of Paternal Grandmother Is   Family History Reviewed: The family history was reviewed and updated today  Social History    · Alcohol abuse (305 00) (F10 10)   · Always uses seat belt   · Being A Social Drinker   · Daily caffeine consumption   · Denied: History of Drug Use   · Employed   · Full-time employment   · Graduated from high school   · Lives with spouse   · Living Independently With Spouse   · Living With And Caring For Child (V61 49)   · Denied: History of Marijuana   · Marital History - Currently    ·    · No drug use   · Occupation:   · Prescription drug abuse (305 90)   · Sexually Active   · Denied: History of Sexually Active High-risk   · Smoker (305 1) (F17 200)  Social History Reviewed: The social history was reviewed and updated today  The social history was reviewed and is unchanged  Current Meds    1  Doxycycline Hyclate 100 MG Oral Tablet; TAKE 1 TABLET EVERY 12 HOURS DAILY; Therapy: 12TYN7274 to (Alda Dale)  Requested for: 71PHU3884; Last Rx:37Tmc2183     Ordered   2  Vitamin D3 2000 UNIT Oral Capsule; TAKE 1 CAPSULE Every morning; Therapy: 85NYY9750 to (Evaluate:21Xxw7561) Recorded  Medication List Reviewed: The medication list was reviewed and updated today  Allergies   1  No Known Drug Allergies  Denied    2   Anesthesia IV Set MISC    Vitals   Vital Signs    Recorded: 62JDH2587 08:35AM   Heart Rate 68   Respiration 16   Systolic 469, LUE, Sitting   Diastolic 78, LUE, Sitting   Height 5 ft 11 in   Weight 196 lb 0 2 oz   BMI Calculated 27 34   BSA Calculated 2 09   O2 Saturation 99     Physical Exam        Constitutional      General appearance: No acute distress, well appearing and well nourished  Eyes      Conjunctiva and lids: No swelling, erythema, or discharge  Pupils and irises: Equal, round and reactive to light  Ears, Nose, Mouth, and Throat      External inspection of ears and nose: Normal        Otoscopic examination: Tympanic membrance translucent with normal light reflex  Canals patent without erythema  Nasal mucosa, septum, and turbinates: Normal without edema or erythema  Oropharynx: Normal with no erythema, edema, exudate or lesions  Pulmonary      Respiratory effort: No increased work of breathing or signs of respiratory distress  Auscultation of lungs: Clear to auscultation, equal breath sounds bilaterally, no wheezes, no rales, no rhonci  Cardiovascular      Auscultation of heart: Normal rate and rhythm, normal S1 and S2, without murmurs  Examination of extremities for edema and/or varicosities: Normal        Abdomen      Abdomen: Non-tender, no masses  Liver and spleen: No hepatomegaly or splenomegaly  Lymphatic      Palpation of lymph nodes in neck: No lymphadenopathy  Psychiatric      Mood and affect: Abnormal   Mood and Affect: depressed  Results/Data   PHQ-9 Adult Depression Screening 35GRN3578 08:39AM User, s      Test Name Result Flag Reference   PHQ-9 Adult Depression Score 0     Over the last two weeks, how often have you been bothered by any of the following problems?      Little interest or pleasure in doing things: Not at all - 0     Feeling down, depressed, or hopeless: Not at all - 0     Trouble falling or staying asleep, or sleeping too much: Not at all - 0     Feeling tired or having little energy: Not at all - 0     Poor appetite or over eating: Not at all - 0     Feeling bad about yourself - or that you are a failure or have let yourself or your family down: Not at all - 0     Trouble concentrating on things, such as reading the newspaper or watching television: Not at all - 0     Moving or speaking so slowly that other people could have noticed  Or the opposite -  being so fidgety or restless that you have been moving around a lot more than usual: Not at all - 0     Thoughts that you would be better off dead, or of hurting yourself in some way: Not at all - 0   PHQ-9 Adult Depression Screening Negative     PHQ-9 Difficulty Level Not difficult at all     PHQ-9 Severity No Depression          Health Management   Colon cancer screening   COLONOSCOPY; every 3 years; Last 85MLW2934; Next Due: 93YJB2090;  Active    Signatures    Electronically signed by : Nini Junior DO; Jan 19 2018  9:17AM EST                       (Author)

## 2018-01-22 VITALS
HEART RATE: 59 BPM | DIASTOLIC BLOOD PRESSURE: 99 MMHG | TEMPERATURE: 97 F | BODY MASS INDEX: 31.4 KG/M2 | HEIGHT: 71 IN | WEIGHT: 224.25 LBS | SYSTOLIC BLOOD PRESSURE: 156 MMHG | RESPIRATION RATE: 18 BRPM

## 2018-01-23 ENCOUNTER — GENERIC CONVERSION - ENCOUNTER (OUTPATIENT)
Dept: OTHER | Facility: OTHER | Age: 53
End: 2018-01-23

## 2018-01-23 VITALS
OXYGEN SATURATION: 99 % | RESPIRATION RATE: 16 BRPM | HEIGHT: 71 IN | SYSTOLIC BLOOD PRESSURE: 119 MMHG | DIASTOLIC BLOOD PRESSURE: 78 MMHG | WEIGHT: 196.01 LBS | BODY MASS INDEX: 27.44 KG/M2 | HEART RATE: 68 BPM

## 2018-01-24 ENCOUNTER — TELEPHONE (OUTPATIENT)
Dept: INTERNAL MEDICINE CLINIC | Facility: CLINIC | Age: 53
End: 2018-01-24

## 2018-01-24 NOTE — RESULT NOTES
Message   Notify the patientX-ray of the right shoulder mild to moderate arthritic changes please have the patient see orthopedics Dr Barrera Links, please have the pt follow up with me to discuss as scheduled it also does show old healed clavicle fracture        Verified Results  * XR SHOULDER 2+ VIEW RIGHT 47ARO3849 09:46AM Brenda Tate Order Number: [id number]     Test Name Result Flag Reference   XR SHOULDER 2+ VW RIGHT (Report)     After further review, there is mild deformity of the mid to distal right clavicle likely the sequela of an old healed fracture  RIGHT SHOULDER     INDICATION: Right shoulder pain  COMPARISON: None     VIEWS: 3     IMAGES: 3     FINDINGS:     There is no acute fracture or dislocation  There are degenerative changes of the acromioclavicular joint  No lytic or blastic lesions are seen  Soft tissues are unremarkable  IMPRESSION:     No acute osseous abnormality  Mild to moderate arthritic changes of the acromioclavicular joint         Workstation performed: [id number]     Signed by:   Mena Hartmann MD   10/9/17

## 2018-03-07 NOTE — PSYCH
History of Present Illness    Presenting Problems: Stressors: PATIENT HAVING PAIN ISSUES AND WORK STRESS  Referral Source: Amiracooper Ureña  He is employed  at [employer]  He is not a smoker  Symptoms: no suicidal ideation, no self abusive behaviors, no homicidal thoughts, no history of violence toward others, depressed mood, no anxiety, no psychosis, no medication noncompliance, sleep disturbances, no change in appetite, no agitation, no hypomania and DECREASE ENERGY AND MOTIVATION   Provisional Diagnosis: Axis I: MAJOR DEPRESSION F32 9  Substance Abuse: No substance abuse  Psychiatric Treatment History: NONE, Current Psychiatrist: NONE and Therapist: NONE  Legal Issues: The patient does not have legal issues  Action: Record received  ACCEPTED  Appointment Date: 01/23/2017        Signatures   Electronically signed by : Jostin Dawson, ; Jan 16 2017  3:42PM EST                       (Author)

## 2018-04-19 DIAGNOSIS — R73.03 PREDIABETES: ICD-10-CM

## 2018-04-19 DIAGNOSIS — Z00.00 ENCOUNTER FOR GENERAL ADULT MEDICAL EXAMINATION WITHOUT ABNORMAL FINDINGS: ICD-10-CM

## 2018-04-24 ENCOUNTER — OFFICE VISIT (OUTPATIENT)
Dept: INTERNAL MEDICINE CLINIC | Facility: CLINIC | Age: 53
End: 2018-04-24
Payer: COMMERCIAL

## 2018-04-24 VITALS
RESPIRATION RATE: 16 BRPM | DIASTOLIC BLOOD PRESSURE: 70 MMHG | HEIGHT: 71 IN | SYSTOLIC BLOOD PRESSURE: 122 MMHG | WEIGHT: 203.8 LBS | BODY MASS INDEX: 28.53 KG/M2 | OXYGEN SATURATION: 94 % | HEART RATE: 69 BPM

## 2018-04-24 DIAGNOSIS — R73.03 PREDIABETES: ICD-10-CM

## 2018-04-24 DIAGNOSIS — Z72.0 TOBACCO ABUSE: ICD-10-CM

## 2018-04-24 DIAGNOSIS — E78.1 HYPERTRIGLYCERIDEMIA: ICD-10-CM

## 2018-04-24 DIAGNOSIS — F10.20 ALCOHOLISM (HCC): ICD-10-CM

## 2018-04-24 DIAGNOSIS — IMO0002 HISTORY OF PRESCRIPTION DRUG ABUSE: ICD-10-CM

## 2018-04-24 DIAGNOSIS — M19.019 ARTHRITIS OF SHOULDER: Primary | ICD-10-CM

## 2018-04-24 DIAGNOSIS — M19.011 ARTHRITIS OF RIGHT ACROMIOCLAVICULAR JOINT: ICD-10-CM

## 2018-04-24 PROBLEM — F32.A DEPRESSION: Status: ACTIVE | Noted: 2017-01-23

## 2018-04-24 PROBLEM — F41.9 ANXIETY: Status: ACTIVE | Noted: 2017-04-12

## 2018-04-24 PROBLEM — F32.A DEPRESSION: Status: RESOLVED | Noted: 2017-01-23 | Resolved: 2018-04-24

## 2018-04-24 PROBLEM — F41.9 ANXIETY: Status: RESOLVED | Noted: 2017-04-12 | Resolved: 2018-04-24

## 2018-04-24 PROCEDURE — 99214 OFFICE O/P EST MOD 30 MIN: CPT | Performed by: INTERNAL MEDICINE

## 2018-04-24 NOTE — ASSESSMENT & PLAN NOTE
I reviewed the x-ray with the patient at this point time he would like to go for steroid injection I will have him see orthopedics

## 2018-04-24 NOTE — PROGRESS NOTES
Assessment/Plan:         Diagnoses and all orders for this visit:    Arthritis of shoulder  Comments: Will have the patient see Orthopedics for steroid injection  Orders:  -     Ambulatory referral to Orthopedic Surgery; Future    Hypertriglyceridemia  Comments:  I have counselled the pt to follow a healthy and balanced diet ,and recommend routine exercise  Prediabetes  Comments:  Reduce carbohydrates, sweets, snacking, routine walking will check labs  Arthritis of right acromioclavicular joint    Alcoholism ([facility] [address] )  Comments:  Status post rehab doing very well from the standpoint no alcohol or drug since February    History of prescription drug abuse [facility])  Comments:  Status post rehab he has not had alcohol or drug since February I have commended him on his progress  Tobacco abuse  Comments:  I have counseled the patient to quit smoking, I have recommended that the patient set up a quit date and I have asked the patient to cut down till quit date        Return to office 6 months  call if any problems  Subjective:      Patient ID: Jaymie Friedman is a 45 y o  male  HPI 53-year old male coming in for a follow up visit regarding arthritis of the shoulder, hypertriglyceridemia, prediabetes, alcoholism, prescription drug abuser former; The patient reports me compliant taking medications without untoward side effects the  The patient is here to review his medical condition, update me on the medical condition and the patient reports me no hospitalizations and no ER visits  Last drug /etoh [employer] work , walk; the tp is smoking 1 ppd x 8 yrs  Quit June 14 ; The following portions of the patient's history were reviewed and updated as appropriate: allergies, past family history, past medical history, past social history, past surgical history and problem list     Review of Systems   Constitutional: Negative for activity change, appetite change, chills, fever and unexpected weight change     HENT: Negative for hearing loss and postnasal drip  Eyes: Negative for pain  Respiratory: Negative for cough and shortness of breath  Cardiovascular: Negative for chest pain  Gastrointestinal: Negative for abdominal distention, abdominal pain, diarrhea, nausea and vomiting  Neurological: Negative for dizziness, light-headedness and headaches  Psychiatric/Behavioral: Negative for suicidal ideas  Objective:      /70 (BP Location: Right arm, Patient Position: Sitting, Cuff Size: Standard)   Pulse 69   Resp 16   Ht 5' 11" (1 803 m)   Wt 92 4 kg (203 lb 12 8 oz)   SpO2 94%   BMI 28 42 kg/m²                     No Follow-up on file  No Known Allergies    Past Medical History:   Diagnosis Date    Arthritis     Cat bite     Concussion     Last Assessed: 1/23/2017    H/o Lyme disease     H/O opioid abuse     Heart murmur     Joint pain     Seizure ([address] )     Last Assessed 1/23/2017    Wears glasses     "cheaters"      Past Surgical History:   Procedure Laterality Date    BACK SURGERY      lumabr herniated disc repair    BACK SURGERY      LUMBAR DISCECTOMY  03/13/2014     Right L4-L5 minimslly invasive microdiscectomy for decompression with METRx system  Operating room microscope for microdissection  Right L4-L5 epidursl steroid injection with 40 mg of Depo-Medrol      OTHER SURGICAL HISTORY  2013    lymph node removed d/t cat bite     OK COLONOSCOPY FLX DX W/COLLJ SPEC WHEN PFRMD N/A 10/30/2017    Procedure: COLONOSCOPY;  Surgeon: Evens Thornton MD;  Location: [facility];   Service: Gastroenterology     Current Outpatient Prescriptions on File Prior to Visit   Medication Sig Dispense Refill    cholecalciferol (VITAMIN D3) 1,000 units tablet Take 2,000 Units by mouth daily      buprenorphine-naloxone (SUBOXONE) 8-2 mg Place 1 Film under the tongue daily      doxycycline hyclate (VIBRAMYCIN) 100 mg capsule Take 100 mg by mouth every 12 (twelve) hours       No current facility-administered medications on file prior to visit  Family History   Problem Relation Age of Onset    Anxiety disorder Mother     Other Mother      Back Disorder     Depression Mother     Emphysema Mother     Heart attack Father     Other Father      Back Disorder     Diabetes Father     Hypertension Father     Diabetes Sister      Social History     Social History    Marital status: /Civil Union     Spouse name: N/A    Number of children: N/A    Years of education: N/A     Occupational History    Not on file       Social History Main Topics    Smoking status: Current Every Day Smoker     Packs/day: 1 00     Types: Cigarettes    Smokeless tobacco: Never Used    Alcohol use 33 6 oz/week     56 Cans of beer per week      Comment: social    Drug use: No      Comment: h/o drug abuse/ Per allscripts: Prescription Drug  Abuse      Sexual activity: Not on file     Other Topics Concern    Not on file     Social History Narrative    Per Allscripts:     Always uses seat belt     Daily Caffeine consumption     Employed    Full-time employment     Graduted from high school     Lives independently with spouse     Living with and caring for child      Vitals:    04/24/18 0856   BP: 122/70   BP Location: Right arm   Patient Position: Sitting   Cuff Size: Standard   Pulse: 69   Resp: 16   SpO2: 94%   Weight: 92 4 kg (203 lb 12 8 oz)   Height: 5' 11" (1 803 m)     Results for orders placed or performed during the hospital encounter of 10/30/17   Tissue Exam   Result Value Ref Range    Case Report       Surgical Pathology Report                         Case: [id number]                                   Authorizing Provider:  Dixon Adler MD   Collected:           10/30/2017 1028              Ordering Location:     [facility] Received:            10/30/2017 [address]                                     Operating Room Pathologist:           Dylon Lemon MD                                                        Specimen:    Polyp, Colorectal, Cold snare polyp splenic flexure                                        Final Diagnosis       A  Colon, splenic flexure polyp, biopsy:   -  Tubular adenoma, negative for high-grade dysplasia  Additional Information       All controls performed with the immunohistochemical stains reported above reacted appropriately  These tests were developed and their performance characteristics determined by [facility] or [facility]  They may not be cleared or approved by the [place]  Food and Drug Administration  The FDA has determined that such clearance or approval is not necessary  These tests are used for clinical purposes  They should not be regarded as investigational or for research  This laboratory has been approved by [facility], designated as a high-complexity laboratory and is qualified to perform these tests  Interpretation performed at , [address] [place] [address]        Gross Description       A  The specimen is received in formalin, labeled with the patient's name and hospital number, and is designated " Polyp splenic flexure"  The specimen consists of 2 tan soft tissue fragments measuring 0 2 and 0 4 cm  Entirely submitted  One cassette  Note: The estimated total formalin fixation time based upon information provided by the submitting clinician and the standard processing schedule is less than 72 hours  MAC        Weight (last 2 days)     Date/Time   Weight    04/24/18 0856  92 4 (203 8)            Body mass index is 28 42 kg/m²  BP      Temp      Pulse     Resp      SpO2        Vitals:    04/24/18 0856   Weight: 92 4 kg (203 lb 12 8 oz)     Vitals:    04/24/18 0856   Weight: 92 4 kg (203 lb 12 8 oz)        Physical Exam   Constitutional: He appears well-developed and well-nourished  No distress     HENT: Head: Normocephalic and atraumatic  Right Ear: External ear normal    Left Ear: External ear normal    Mouth/Throat: Oropharynx is clear and moist    Eyes: Conjunctivae are normal  Pupils are equal, round, and reactive to light  Right eye exhibits no discharge  Left eye exhibits no discharge  No scleral icterus  Neck: Neck supple  Cardiovascular: Normal rate, regular rhythm and normal heart sounds  Exam reveals no gallop and no friction rub  No murmur heard  Pulmonary/Chest: No respiratory distress  He has no wheezes  He has no rales  Abdominal: Soft  Bowel sounds are normal  He exhibits no distension and no mass  There is no tenderness  There is no rebound and no guarding  Musculoskeletal: He exhibits no edema or deformity  Lymphadenopathy:     He has no cervical adenopathy  Neurological: He is alert  Skin: He is not diaphoretic  Psychiatric: He has a normal mood and affect

## 2018-06-04 ENCOUNTER — OFFICE VISIT (OUTPATIENT)
Dept: OBGYN CLINIC | Facility: CLINIC | Age: 53
End: 2018-06-04
Payer: COMMERCIAL

## 2018-06-04 VITALS
HEIGHT: 71 IN | WEIGHT: 205.2 LBS | HEART RATE: 59 BPM | DIASTOLIC BLOOD PRESSURE: 75 MMHG | SYSTOLIC BLOOD PRESSURE: 120 MMHG | BODY MASS INDEX: 28.73 KG/M2

## 2018-06-04 DIAGNOSIS — M25.511 CHRONIC PAIN OF BOTH SHOULDERS: Primary | ICD-10-CM

## 2018-06-04 DIAGNOSIS — M19.019 ARTHRITIS OF SHOULDER: ICD-10-CM

## 2018-06-04 DIAGNOSIS — M75.81 TENDINITIS OF BOTH ROTATOR CUFFS: ICD-10-CM

## 2018-06-04 DIAGNOSIS — M75.82 TENDINITIS OF BOTH ROTATOR CUFFS: ICD-10-CM

## 2018-06-04 DIAGNOSIS — G89.29 CHRONIC PAIN OF BOTH SHOULDERS: Primary | ICD-10-CM

## 2018-06-04 DIAGNOSIS — M25.512 CHRONIC PAIN OF BOTH SHOULDERS: Primary | ICD-10-CM

## 2018-06-04 PROCEDURE — 99243 OFF/OP CNSLTJ NEW/EST LOW 30: CPT | Performed by: FAMILY MEDICINE

## 2018-06-04 RX ORDER — NAPROXEN 500 MG/1
500 TABLET ORAL 2 TIMES DAILY WITH MEALS
Qty: 30 TABLET | Refills: 0 | Status: SHIPPED | OUTPATIENT
Start: 2018-06-04 | End: 2018-11-21 | Stop reason: ALTCHOICE

## 2018-06-04 NOTE — PROGRESS NOTES
Assessment/Plan:  Assessment/Plan   Diagnoses and all orders for this visit:    Chronic pain of both shoulders  -     naproxen (NAPROSYN) 500 mg tablet; Take 1 tablet (500 mg total) by mouth 2 (two) times a day with meals    Tendinitis of both rotator cuffs  -     naproxen (NAPROSYN) 500 mg tablet; Take 1 tablet (500 mg total) by mouth 2 (two) times a day with meals    Arthritis of shoulder  Comments: Will have the patient see Orthopedics for steroid injection  Orders:  -     Ambulatory referral to Orthopedic Surgery       49-year-old male with right shoulder pain  Discussed with patient physical exam, review imaging, impression, and plan  Review of x-rays from October 2017 is significant for arthritis of the acromioclavicular joint  His physical exam is unremarkable for bony or soft tissue tenderness, and noted for normal range of motion of both shoulders and normal strength  There is mild positive Dubon of both shoulders  Clinical impression is rotator cuff tendinitis  I discussed with patient treatment regimen of taking anti-inflammatory and physical therapy  He declined physical therapy at this time  He is to take naproxen 500 mg twice daily with food consistently for 2 weeks, and thereafter only as needed  He will follow up with me on as-needed basis  Subjective:   Patient ID: Marino Finley is a 45 y o  male  Chief Complaint   Patient presents with    Right Shoulder - Pain         49-year-old right hand dominant male presents for evaluation of pain of both shoulders, left greater than right of several years duration  He denies any trauma or inciting event, but reports extensive history of working as an  with working overhead on a daily basis    Pain is described as gradual in onset, localized to lateral aspects of the shoulders, sometimes radiating down the lateral upper arm, worse with elevating the arm above shoulder level, and improved with rest   He has been taking ibuprofen intermittently which he states provides relief of the pain  He had x-ray done in October 2017 which showed right shoulder AC joint arthritis, he does report history of right clavicular fracture years ago  He denies any numbness / tingling of the upper extremities or any weakness of the upper extremities  Shoulder Pain   This is a chronic problem  The problem occurs constantly  The problem has been unchanged  Associated symptoms include arthralgias  Pertinent negatives include no joint swelling, numbness or weakness  Exacerbated by: Arm elevation  He has tried rest and NSAIDs for the symptoms  The treatment provided moderate relief  The following portions of the patient's history were reviewed and updated as appropriate: He  has a past medical history of Arthritis; Cat bite; Concussion; H/o Lyme disease; H/O opioid abuse; Heart murmur; Joint pain; Seizure ([address] ); and Wears glasses  He  has a past surgical history that includes Back surgery; pr colonoscopy flx dx w/collj spec when pfrmd (N/A, 10/30/2017); Back surgery; Lumbar discectomy (03/13/2014); and Other surgical history (2013)  His family history includes Anxiety disorder in his mother; Depression in his mother; Diabetes in his father and sister; Emphysema in his mother; Heart attack in his father; Hypertension in his father; Other in his father and mother  He  reports that he has been smoking Cigarettes  He has been smoking about 1 00 pack per day  He has never used smokeless tobacco  He reports that he drinks about 33 6 oz of alcohol per week   He reports that he does not use drugs  He has No Known Allergies       Review of Systems   Musculoskeletal: Positive for arthralgias  Negative for joint swelling  Neurological: Negative for weakness and numbness         Objective:  Vitals:    06/04/18 0917   BP: 120/75   Pulse: 59   Weight: 93 1 kg (205 lb 3 2 oz)   Height: 5' 11" (1 803 m)     Right Elbow Exam     Tenderness   The patient is experiencing no tenderness  Range of Motion   The patient has normal right elbow ROM  Muscle Strength   The patient has normal right elbow strength  Left Elbow Exam     Tenderness   The patient is experiencing no tenderness  Range of Motion   The patient has normal left elbow ROM  Muscle Strength   The patient has normal left elbow strength  Right Shoulder Exam     Tenderness   The patient is experiencing no tenderness  Range of Motion   The patient has normal right shoulder ROM  Internal Rotation 0 degrees: Mid thoracic     Muscle Strength   The patient has normal right shoulder strength  Tests   Hawkin's test: positive ( mild)  Impingement: negative    Other   Sensation: normal    Comments:  Negative belly press  Negative push-off      Left Shoulder Exam     Tenderness   The patient is experiencing no tenderness  Range of Motion   The patient has normal left shoulder ROM  Internal Rotation 0 degrees: Mid thoracic     Muscle Strength   The patient has normal left shoulder strength  Tests   Hawkin's test: positive (Mild)  Impingement: negative    Other   Sensation: normal     Comments:  Negative belly press  Negative push-off            Physical Exam   Constitutional: He is oriented to person, place, and time  He appears well-developed  No distress  HENT:   Head: Normocephalic and atraumatic  Eyes: Conjunctivae are normal    Neck: No tracheal deviation present  Cardiovascular: Normal rate  Pulmonary/Chest: Effort normal  No respiratory distress  Abdominal: He exhibits no distension  Neurological: He is alert and oriented to person, place, and time  Skin: Skin is warm and dry  Psychiatric: He has a normal mood and affect  His behavior is normal    Nursing note and vitals reviewed  I have personally reviewed pertinent films in PACS

## 2018-06-04 NOTE — LETTER
June 4, 2018     Nael Mercerzarmand  [address]  [address] [place] [address]    Patient: Ernesto Santillan   YOB: 1965   Date of Visit: 6/4/2018       Dear Dr Marga Yuen: Thank you for referring Heriberto Donaldson to me for evaluation  Below are my notes for this consultation  If you have questions, please do not hesitate to call me  I look forward to following your patient along with you  Sincerely,        [employer], DO        CC: No Recipients  [employer], DO  6/4/2018 12:47 PM  Sign at close encounter  Assessment/Plan:  Assessment/Plan   Diagnoses and all orders for this visit:    Chronic pain of both shoulders  -     naproxen (NAPROSYN) 500 mg tablet; Take 1 tablet (500 mg total) by mouth 2 (two) times a day with meals    Tendinitis of both rotator cuffs  -     naproxen (NAPROSYN) 500 mg tablet; Take 1 tablet (500 mg total) by mouth 2 (two) times a day with meals    Arthritis of shoulder  Comments: Will have the patient see Orthopedics for steroid injection  Orders:  -     Ambulatory referral to Orthopedic Surgery       68-year-old male with right shoulder pain  Discussed with patient physical exam, review imaging, impression, and plan  Review of x-rays from October 2017 is significant for arthritis of the acromioclavicular joint  His physical exam is unremarkable for bony or soft tissue tenderness, and noted for normal range of motion of both shoulders and normal strength  There is mild positive Dubon of both shoulders  Clinical impression is rotator cuff tendinitis  I discussed with patient treatment regimen of taking anti-inflammatory and physical therapy  He declined physical therapy at this time  He is to take naproxen 500 mg twice daily with food consistently for 2 weeks, and thereafter only as needed  He will follow up with me on as-needed basis  Subjective:   Patient ID: Ernesto Santillan is a 45 y o  male    Chief Complaint   Patient presents with    Right Shoulder - Pain         45year-old right hand dominant male presents for evaluation of pain of both shoulders, left greater than right of several years duration  He denies any trauma or inciting event, but reports extensive history of working as an  with working overhead on a daily basis  Pain is described as gradual in onset, localized to lateral aspects of the shoulders, sometimes radiating down the lateral upper arm, worse with elevating the arm above shoulder level, and improved with rest   He has been taking ibuprofen intermittently which he states provides relief of the pain  He had x-ray done in October 2017 which showed right shoulder AC joint arthritis, he does report history of right clavicular fracture years ago  He denies any numbness / tingling of the upper extremities or any weakness of the upper extremities  Shoulder Pain   This is a chronic problem  The problem occurs constantly  The problem has been unchanged  Associated symptoms include arthralgias  Pertinent negatives include no joint swelling, numbness or weakness  Exacerbated by: Arm elevation  He has tried rest and NSAIDs for the symptoms  The treatment provided moderate relief  The following portions of the patient's history were reviewed and updated as appropriate: He  has a past medical history of Arthritis; Cat bite; Concussion; H/o Lyme disease; H/O opioid abuse; Heart murmur; Joint pain; Seizure ([facility] [address] ); and Wears glasses  He  has a past surgical history that includes Back surgery; pr colonoscopy flx dx w/collj spec when pfrmd (N/A, 10/30/2017); Back surgery; Lumbar discectomy (03/13/2014); and Other surgical history (2013)  His family history includes Anxiety disorder in his mother; Depression in his mother; Diabetes in his father and sister; Emphysema in his mother; Heart attack in his father; Hypertension in his father; Other in his father and mother  He  reports that he has been smoking Cigarettes  He has been smoking about 1 00 pack per day  He has never used smokeless tobacco  He reports that he drinks about 33 6 oz of alcohol per week   He reports that he does not use drugs  He has No Known Allergies       Review of Systems   Musculoskeletal: Positive for arthralgias  Negative for joint swelling  Neurological: Negative for weakness and numbness  Objective:  Vitals:    06/04/18 0917   BP: 120/75   Pulse: 59   Weight: 93 1 kg (205 lb 3 2 oz)   Height: 5' 11" (1 803 m)     Right Elbow Exam     Tenderness   The patient is experiencing no tenderness  Range of Motion   The patient has normal right elbow ROM  Muscle Strength   The patient has normal right elbow strength  Left Elbow Exam     Tenderness   The patient is experiencing no tenderness  Range of Motion   The patient has normal left elbow ROM  Muscle Strength   The patient has normal left elbow strength  Right Shoulder Exam     Tenderness   The patient is experiencing no tenderness  Range of Motion   The patient has normal right shoulder ROM  Internal Rotation 0 degrees: Mid thoracic     Muscle Strength   The patient has normal right shoulder strength  Tests   Hawkin's test: positive ( mild)  Impingement: negative    Other   Sensation: normal    Comments:  Negative belly press  Negative push-off      Left Shoulder Exam     Tenderness   The patient is experiencing no tenderness  Range of Motion   The patient has normal left shoulder ROM  Internal Rotation 0 degrees: Mid thoracic     Muscle Strength   The patient has normal left shoulder strength  Tests   Hawkin's test: positive (Mild)  Impingement: negative    Other   Sensation: normal     Comments:  Negative belly press  Negative push-off            Physical Exam   Constitutional: He is oriented to person, place, and time  He appears well-developed  No distress  HENT:   Head: Normocephalic and atraumatic     Eyes: Conjunctivae are normal  Neck: No tracheal deviation present  Cardiovascular: Normal rate  Pulmonary/Chest: Effort normal  No respiratory distress  Abdominal: He exhibits no distension  Neurological: He is alert and oriented to person, place, and time  Skin: Skin is warm and dry  Psychiatric: He has a normal mood and affect  His behavior is normal    Nursing note and vitals reviewed  I have personally reviewed pertinent films in PACS

## 2018-11-21 ENCOUNTER — OFFICE VISIT (OUTPATIENT)
Dept: INTERNAL MEDICINE CLINIC | Facility: CLINIC | Age: 53
End: 2018-11-21
Payer: COMMERCIAL

## 2018-11-21 VITALS
DIASTOLIC BLOOD PRESSURE: 78 MMHG | HEART RATE: 68 BPM | BODY MASS INDEX: 28.78 KG/M2 | SYSTOLIC BLOOD PRESSURE: 122 MMHG | OXYGEN SATURATION: 98 % | WEIGHT: 205.6 LBS | RESPIRATION RATE: 16 BRPM | HEIGHT: 71 IN

## 2018-11-21 DIAGNOSIS — Z72.0 TOBACCO ABUSE: Primary | ICD-10-CM

## 2018-11-21 DIAGNOSIS — Z11.59 ENCOUNTER FOR HEPATITIS C SCREENING TEST FOR LOW RISK PATIENT: ICD-10-CM

## 2018-11-21 DIAGNOSIS — R73.03 PREDIABETES: ICD-10-CM

## 2018-11-21 DIAGNOSIS — F11.20 UNCOMPLICATED OPIOID DEPENDENCE (HCC): ICD-10-CM

## 2018-11-21 DIAGNOSIS — Z23 NEED FOR 23-POLYVALENT PNEUMOCOCCAL POLYSACCHARIDE VACCINE: ICD-10-CM

## 2018-11-21 DIAGNOSIS — R07.9 CHEST PAIN, UNSPECIFIED TYPE: ICD-10-CM

## 2018-11-21 PROCEDURE — 90471 IMMUNIZATION ADMIN: CPT

## 2018-11-21 PROCEDURE — 90732 PPSV23 VACC 2 YRS+ SUBQ/IM: CPT

## 2018-11-21 PROCEDURE — 3008F BODY MASS INDEX DOCD: CPT | Performed by: INTERNAL MEDICINE

## 2018-11-21 PROCEDURE — 99214 OFFICE O/P EST MOD 30 MIN: CPT | Performed by: INTERNAL MEDICINE

## 2018-11-21 RX ORDER — NICOTINE 21 MG/24HR
1 PATCH, TRANSDERMAL 24 HOURS TRANSDERMAL EVERY 24 HOURS
Qty: 28 PATCH | Refills: 0 | Status: SHIPPED | OUTPATIENT
Start: 2018-11-21 | End: 2020-09-02 | Stop reason: SDUPTHER

## 2018-11-21 RX ORDER — NICOTINE 21 MG/24HR
1 PATCH, TRANSDERMAL 24 HOURS TRANSDERMAL EVERY 24 HOURS
Qty: 14 PATCH | Refills: 0 | Status: SHIPPED | OUTPATIENT
Start: 2018-11-21 | End: 2020-09-02 | Stop reason: SDUPTHER

## 2018-11-21 NOTE — PROGRESS NOTES
Assessment/Plan:           Problem List Items Addressed This Visit        Other    Tobacco abuse - Primary     The patient is interested in quitting smoking we have discussed several ways of quitting smoking he will set up a quit date he will wean down his tobacco he will start a nicotine patch 21 mg once a day times 4 weeks then 14 mg per day times 2 weeks then 7 mg per day x2 weeks he should never smoked while on the patch  Relevant Medications    nicotine (NICODERM CQ) 14 mg/24hr TD 24 hr patch    nicotine (NICODERM CQ) 21 mg/24 hr TD 24 hr patch    nicotine (NICODERM CQ) 7 mg/24hr TD 24 hr patch    Chest pain     The patient does report me developed episode chest pain after chopping wood rule out cardiac versus musculoskeletal will check a stress echo EKG is sinus bradycardia no acute changes         Relevant Orders    Echo stress test w contrast if indicated    Encounter for hepatitis C screening test for low risk patient     Counseled, patient will go for the hepatitis C antibody         Relevant Orders    Hepatitis C antibody    Need for 23-polyvalent pneumococcal polysaccharide vaccine     Counseled, patient will receive the pneumonia shot         Relevant Orders    Pneumococcal polysaccharide vaccine 23-valent greater than or equal to 3yo subcutaneous/IM (Completed)    Uncomplicated opioid dependence ([address] )     He reports me that he is drug free and doing well         Prediabetes     Pre diabetes -I have counseled the patient to follow a healthy balanced diet, I have counseled patient reduce carbohydrates and sweets in the diet, I would like the patient exercise routinely  I will be checking hemoglobin A1c and comprehensive metabolic panel  Have counseled patient about the prevention of diabetes, and the risk of progression to type 2 diabetes  EKG sinus bradycardia no acute changes  Subjective:      Patient ID: Verito Brooks is a 52 y o  male      HPI  45-year old male coming in for a follow up visit regarding tobacco abuse, chest pain, prediabetes, former opiate dependence; The patient reports me compliant taking medications without untoward side effects the  The patient is here to review his medical condition, update me on the medical condition and the patient reports me no hospitalizations and no ER visits  patient does report me he is interested in quitting smoking he has already made 1 attempt in  he was able to quit for about 2 weeks  For a stopped cold turkey the last time  1 ppd 4-5 yrs, wants to quit by new years  , he reports me it developed some chest pain previous week while chopping wood he is uncertain if his musculoskeletal but he has a very strong family history of cardiovascular disease his mom and dad  in her 45s of heart attack he is a smoker  He reports me no drug use    The following portions of the patient's history were reviewed and updated as appropriate: allergies, current medications, past family history, past medical history, past social history, past surgical history and problem list     Review of Systems   Constitutional: Negative for activity change, appetite change and unexpected weight change  HENT: Negative for congestion and postnasal drip  Eyes: Negative for visual disturbance  Respiratory: Negative for cough and shortness of breath  Cardiovascular: Positive for chest pain  Gastrointestinal: Negative for abdominal pain, diarrhea, nausea and vomiting  Neurological: Negative for dizziness, light-headedness and headaches  Hematological: Negative for adenopathy  Objective:                  Return in about 6 months (around 2019)        No Known Allergies    Past Medical History:   Diagnosis Date    Arthritis     Cat bite     Concussion     Last Assessed: 2017    H/o Lyme disease     H/O opioid abuse     Heart murmur     Joint pain     Seizure [facility])     Last Assessed 2017    Wears glasses     "cheaters" Past Surgical History:   Procedure Laterality Date    BACK SURGERY      lumabr herniated disc repair    BACK SURGERY      LUMBAR DISCECTOMY  03/13/2014     Right L4-L5 minimslly invasive microdiscectomy for decompression with METRx system  Operating room microscope for microdissection  Right L4-L5 epidursl steroid injection with 40 mg of Depo-Medrol      OTHER SURGICAL HISTORY  2013    lymph node removed d/t cat bite     [place] COLONOSCOPY FLX DX W/COLLJ SPEC WHEN PFRMD N/A 10/30/2017    Procedure: COLONOSCOPY;  Surgeon: Amor Huerta MD;  Location: [facility]; Service: Gastroenterology     Current Outpatient Prescriptions on File Prior to Visit   Medication Sig Dispense Refill    cholecalciferol (VITAMIN D3) 1,000 units tablet Take 2,000 Units by mouth daily       No current facility-administered medications on file prior to visit  Family History   Problem Relation Age of Onset    Anxiety disorder Mother     Other Mother         Back Disorder     Depression Mother     Emphysema Mother     Heart attack Father     Other Father         Back Disorder     Diabetes Father     Hypertension Father     Diabetes Sister      Social History     Social History    Marital status: /Civil Union     Spouse name: N/A    Number of children: N/A    Years of education: N/A     Occupational History    Not on file       Social History Main Topics    Smoking status: Current Every Day Smoker     Packs/day: 1 00     Types: Cigarettes    Smokeless tobacco: Never Used    Alcohol use 33 6 oz/week     56 Cans of beer per week      Comment: social    Drug use: No      Comment: h/o drug abuse/ Per allscripts: Prescription Drug  Abuse      Sexual activity: Not on file     Other Topics Concern    Not on file     Social History Narrative    Per Allscripts:     Always uses seat belt     Daily Caffeine consumption     Employed    Full-time employment     Graduted from high school     Lives independently with spouse     Living with and caring for child      Vitals:    11/21/18 0756   BP: 122/78   BP Location: Right arm   Patient Position: Sitting   Cuff Size: Standard   Pulse: 68   Resp: 16   SpO2: 98%   Weight: 93 3 kg (205 lb 9 6 oz)   Height: 5' 11" (1 803 m)     Results for orders placed or performed during the hospital encounter of 10/30/17   Tissue Exam   Result Value Ref Range    Case Report       Surgical Pathology Report                         Case: [id number]                                   Authorizing Provider:  Deepak Morales MD   Collected:           10/30/2017 1028              Ordering Location:     [facility] Received:            10/30/2017 1159                                     Operating Room                                                               Pathologist:           Kervin Damon MD                                                        Specimen:    Polyp, Colorectal, Cold snare polyp splenic flexure                                        Final Diagnosis       A  Colon, splenic flexure polyp, biopsy:   -  Tubular adenoma, negative for high-grade dysplasia  Additional Information       All controls performed with the immunohistochemical stains reported above reacted appropriately  These tests were developed and their performance characteristics determined by [facility] or [facility]  They may not be cleared or approved by the [place]  Food and Drug Administration  The FDA has determined that such clearance or approval is not necessary  These tests are used for clinical purposes  They should not be regarded as investigational or for research  This laboratory has been approved by [place] [id number], designated as a high-complexity laboratory and is qualified to perform these tests  Interpretation performed at [address] [address]        Gross Description       A   The specimen is received in formalin, labeled with the patient's name and hospital number, and is designated " Polyp splenic flexure"  The specimen consists of 2 tan soft tissue fragments measuring 0 2 and 0 4 cm  Entirely submitted  One cassette  Note: The estimated total formalin fixation time based upon information provided by the submitting clinician and the standard processing schedule is less than 72 hours  MAC        Weight (last 2 days)     Date/Time   Weight    11/21/18 0756  93 3 (205 6)            Body mass index is 28 68 kg/m²  BP      Temp      Pulse     Resp      SpO2        Vitals:    11/21/18 0756   Weight: 93 3 kg (205 lb 9 6 oz)     Vitals:    11/21/18 0756   Weight: 93 3 kg (205 lb 9 6 oz)       /78 (BP Location: Right arm, Patient Position: Sitting, Cuff Size: Standard)   Pulse 68   Resp 16   Ht 5' 11" (1 803 m)   Wt 93 3 kg (205 lb 9 6 oz)   SpO2 98%   BMI 28 68 kg/m²          Physical Exam   Constitutional: He appears well-developed and well-nourished  No distress  HENT:   Head: Normocephalic and atraumatic  Right Ear: External ear normal    Left Ear: External ear normal    Mouth/Throat: Oropharynx is clear and moist    Eyes: Pupils are equal, round, and reactive to light  Conjunctivae are normal  Right eye exhibits no discharge  Left eye exhibits no discharge  No scleral icterus  Neck: Neck supple  Cardiovascular: Normal rate, regular rhythm and normal heart sounds  Exam reveals no gallop and no friction rub  No murmur heard  Pulmonary/Chest: No respiratory distress  He has no wheezes  He has no rales  Abdominal: Soft  Bowel sounds are normal  He exhibits no distension and no mass  There is no tenderness  There is no rebound and no guarding  Musculoskeletal: He exhibits no edema or deformity  Lymphadenopathy:     He has no cervical adenopathy  Neurological: He is alert  Skin: He is not diaphoretic  Psychiatric: He has a normal mood and affect

## 2018-11-22 PROBLEM — F11.20 UNCOMPLICATED OPIOID DEPENDENCE (HCC): Status: ACTIVE | Noted: 2017-01-23

## 2018-11-22 PROBLEM — Z72.0 TOBACCO ABUSE: Status: ACTIVE | Noted: 2018-11-22

## 2018-11-22 PROBLEM — R07.9 CHEST PAIN: Status: ACTIVE | Noted: 2018-11-22

## 2018-11-22 PROBLEM — R73.03 PREDIABETES: Status: ACTIVE | Noted: 2018-11-22

## 2018-11-22 PROBLEM — Z23 NEED FOR 23-POLYVALENT PNEUMOCOCCAL POLYSACCHARIDE VACCINE: Status: ACTIVE | Noted: 2018-11-22

## 2018-11-22 PROBLEM — Z11.59 ENCOUNTER FOR HEPATITIS C SCREENING TEST FOR LOW RISK PATIENT: Status: ACTIVE | Noted: 2018-11-22

## 2018-11-22 PROCEDURE — 93000 ELECTROCARDIOGRAM COMPLETE: CPT | Performed by: INTERNAL MEDICINE

## 2018-11-22 NOTE — ASSESSMENT & PLAN NOTE
The patient is interested in quitting smoking we have discussed several ways of quitting smoking he will set up a quit date he will wean down his tobacco he will start a nicotine patch 21 mg once a day times 4 weeks then 14 mg per day times 2 weeks then 7 mg per day x2 weeks he should never smoked while on the patch

## 2018-11-22 NOTE — ASSESSMENT & PLAN NOTE
The patient does report me developed episode chest pain after chopping wood rule out cardiac versus musculoskeletal will check a stress echo EKG is sinus bradycardia no acute changes

## 2019-04-03 ENCOUNTER — OFFICE VISIT (OUTPATIENT)
Dept: INTERNAL MEDICINE CLINIC | Facility: CLINIC | Age: 54
End: 2019-04-03
Payer: COMMERCIAL

## 2019-04-03 VITALS
HEIGHT: 71 IN | SYSTOLIC BLOOD PRESSURE: 114 MMHG | RESPIRATION RATE: 16 BRPM | BODY MASS INDEX: 29.4 KG/M2 | TEMPERATURE: 98 F | OXYGEN SATURATION: 95 % | WEIGHT: 210 LBS | HEART RATE: 78 BPM | DIASTOLIC BLOOD PRESSURE: 68 MMHG

## 2019-04-03 DIAGNOSIS — R22.32 AXILLARY MASS, LEFT: Primary | ICD-10-CM

## 2019-04-03 PROCEDURE — 3008F BODY MASS INDEX DOCD: CPT | Performed by: NURSE PRACTITIONER

## 2019-04-03 PROCEDURE — 99213 OFFICE O/P EST LOW 20 MIN: CPT | Performed by: NURSE PRACTITIONER

## 2019-04-03 RX ORDER — GINKGO BILOBA LEAF EXTRACT 60 MG
1 CAPSULE ORAL
COMMUNITY

## 2019-04-03 RX ORDER — OMEGA-3 FATTY ACIDS/FISH OIL 300-1000MG
CAPSULE ORAL
COMMUNITY

## 2019-04-05 PROBLEM — R22.32 AXILLARY MASS, LEFT: Status: ACTIVE | Noted: 2019-04-05

## 2019-04-10 ENCOUNTER — APPOINTMENT (OUTPATIENT)
Dept: LAB | Facility: HOSPITAL | Age: 54
End: 2019-04-10
Payer: COMMERCIAL

## 2019-04-10 ENCOUNTER — HOSPITAL ENCOUNTER (OUTPATIENT)
Dept: ULTRASOUND IMAGING | Facility: CLINIC | Age: 54
Discharge: HOME | End: 2019-04-10
Payer: COMMERCIAL

## 2019-04-10 DIAGNOSIS — Z00.00 ENCOUNTER FOR GENERAL ADULT MEDICAL EXAMINATION WITHOUT ABNORMAL FINDINGS: ICD-10-CM

## 2019-04-10 DIAGNOSIS — R73.03 PREDIABETES: ICD-10-CM

## 2019-04-10 DIAGNOSIS — R22.32 AXILLARY MASS, LEFT: ICD-10-CM

## 2019-04-10 DIAGNOSIS — Z11.59 ENCOUNTER FOR HEPATITIS C SCREENING TEST FOR LOW RISK PATIENT: ICD-10-CM

## 2019-04-10 LAB
ALBUMIN SERPL BCP-MCNC: 4.2 G/DL (ref 3.5–5)
ALP SERPL-CCNC: 68 U/L (ref 46–116)
ALT SERPL W P-5'-P-CCNC: 38 U/L (ref 12–78)
ANION GAP SERPL CALCULATED.3IONS-SCNC: 5 MMOL/L (ref 4–13)
AST SERPL W P-5'-P-CCNC: 19 U/L (ref 5–45)
BILIRUB SERPL-MCNC: 0.4 MG/DL (ref 0.2–1)
BUN SERPL-MCNC: 17 MG/DL (ref 5–25)
CALCIUM SERPL-MCNC: 9.5 MG/DL (ref 8.3–10.1)
CHLORIDE SERPL-SCNC: 103 MMOL/L (ref 100–108)
CHOLEST SERPL-MCNC: 194 MG/DL (ref 50–200)
CO2 SERPL-SCNC: 31 MMOL/L (ref 21–32)
CREAT SERPL-MCNC: 0.85 MG/DL (ref 0.6–1.3)
EST. AVERAGE GLUCOSE BLD GHB EST-MCNC: 117 MG/DL
GFR SERPL CREATININE-BSD FRML MDRD: 98 ML/MIN/1.73SQ M
GLUCOSE P FAST SERPL-MCNC: 110 MG/DL (ref 65–99)
HBA1C MFR BLD: 5.7 % (ref 4.2–6.3)
HCV AB SER QL: NORMAL
HDLC SERPL-MCNC: 52 MG/DL (ref 40–60)
LDLC SERPL CALC-MCNC: 119 MG/DL (ref 0–100)
NONHDLC SERPL-MCNC: 142 MG/DL
POTASSIUM SERPL-SCNC: 4.8 MMOL/L (ref 3.5–5.3)
PROT SERPL-MCNC: 7.6 G/DL (ref 6.4–8.2)
PSA SERPL-MCNC: 0.3 NG/ML (ref 0–4)
SODIUM SERPL-SCNC: 139 MMOL/L (ref 136–145)
TRIGL SERPL-MCNC: 114 MG/DL

## 2019-04-10 PROCEDURE — 80053 COMPREHEN METABOLIC PANEL: CPT

## 2019-04-10 PROCEDURE — 76642 ULTRASOUND BREAST LIMITED: CPT

## 2019-04-10 PROCEDURE — 83036 HEMOGLOBIN GLYCOSYLATED A1C: CPT

## 2019-04-10 PROCEDURE — 36415 COLL VENOUS BLD VENIPUNCTURE: CPT

## 2019-04-10 PROCEDURE — 86803 HEPATITIS C AB TEST: CPT

## 2019-04-10 PROCEDURE — 80061 LIPID PANEL: CPT

## 2019-04-10 PROCEDURE — G0103 PSA SCREENING: HCPCS

## 2019-05-09 ENCOUNTER — PROCEDURE VISIT (OUTPATIENT)
Dept: PODIATRY | Facility: CLINIC | Age: 54
End: 2019-05-09
Payer: COMMERCIAL

## 2019-05-09 VITALS
SYSTOLIC BLOOD PRESSURE: 148 MMHG | WEIGHT: 210 LBS | BODY MASS INDEX: 29.4 KG/M2 | HEIGHT: 71 IN | HEART RATE: 65 BPM | DIASTOLIC BLOOD PRESSURE: 99 MMHG

## 2019-05-09 DIAGNOSIS — L60.0 INGROWING NAIL: Primary | ICD-10-CM

## 2019-05-09 DIAGNOSIS — L85.2 KERATODERMA PUNCTATA: ICD-10-CM

## 2019-05-09 PROCEDURE — 99212 OFFICE O/P EST SF 10 MIN: CPT | Performed by: PODIATRIST

## 2019-05-09 PROCEDURE — 11730 AVULSION NAIL PLATE SIMPLE 1: CPT | Performed by: PODIATRIST

## 2019-05-09 RX ORDER — LIDOCAINE HYDROCHLORIDE 10 MG/ML
1 INJECTION, SOLUTION INFILTRATION; PERINEURAL ONCE
Status: COMPLETED | OUTPATIENT
Start: 2019-05-09 | End: 2019-05-09

## 2019-05-09 RX ADMIN — LIDOCAINE HYDROCHLORIDE 1 ML: 10 INJECTION, SOLUTION INFILTRATION; PERINEURAL at 18:11

## 2019-06-11 ENCOUNTER — OFFICE VISIT (OUTPATIENT)
Dept: PODIATRY | Facility: CLINIC | Age: 54
End: 2019-06-11

## 2019-06-11 VITALS
WEIGHT: 205 LBS | BODY MASS INDEX: 28.7 KG/M2 | DIASTOLIC BLOOD PRESSURE: 80 MMHG | SYSTOLIC BLOOD PRESSURE: 127 MMHG | HEIGHT: 71 IN

## 2019-06-11 DIAGNOSIS — L60.0 INGROWING NAIL: Primary | ICD-10-CM

## 2019-06-11 PROCEDURE — 99024 POSTOP FOLLOW-UP VISIT: CPT | Performed by: PODIATRIST

## 2019-10-17 ENCOUNTER — HOSPITAL ENCOUNTER (OUTPATIENT)
Dept: NON INVASIVE DIAGNOSTICS | Facility: HOSPITAL | Age: 54
Discharge: HOME | End: 2019-10-17
Payer: COMMERCIAL

## 2019-10-17 VITALS — SYSTOLIC BLOOD PRESSURE: 130 MMHG | DIASTOLIC BLOOD PRESSURE: 80 MMHG

## 2019-10-17 DIAGNOSIS — R07.9 CHEST PAIN, UNSPECIFIED TYPE: ICD-10-CM

## 2019-10-17 LAB
ARRHY DURING EX: NORMAL
CHEST PAIN STATEMENT: NORMAL
MAX DIASTOLIC BP: 86 MMHG
MAX HEART RATE: 157 BPM
MAX PREDICTED HEART RATE: 164 BPM
MAX. SYSTOLIC BP: 172 MMHG
PROTOCOL NAME: NORMAL
REASON FOR TERMINATION: NORMAL
TARGET HR FORMULA: NORMAL
TEST INDICATION: NORMAL
TIME IN EXERCISE PHASE: NORMAL

## 2019-10-17 PROCEDURE — 93350 STRESS TTE ONLY: CPT

## 2019-10-17 PROCEDURE — 93351 STRESS TTE COMPLETE: CPT | Performed by: INTERNAL MEDICINE

## 2020-01-01 ENCOUNTER — HOSPITAL ENCOUNTER (EMERGENCY)
Facility: HOSPITAL | Age: 55
Discharge: HOME | End: 2020-01-02
Attending: EMERGENCY MEDICINE | Admitting: EMERGENCY MEDICINE
Payer: COMMERCIAL

## 2020-01-01 ENCOUNTER — APPOINTMENT (EMERGENCY)
Dept: CT IMAGING | Facility: HOSPITAL | Age: 55
End: 2020-01-01
Payer: COMMERCIAL

## 2020-01-01 VITALS
BODY MASS INDEX: 30 KG/M2 | WEIGHT: 214.29 LBS | HEIGHT: 71 IN | SYSTOLIC BLOOD PRESSURE: 117 MMHG | OXYGEN SATURATION: 95 % | TEMPERATURE: 97.4 F | DIASTOLIC BLOOD PRESSURE: 74 MMHG | RESPIRATION RATE: 16 BRPM | HEART RATE: 53 BPM

## 2020-01-01 DIAGNOSIS — G50.0 TRIGEMINAL NEURALGIA: Primary | ICD-10-CM

## 2020-01-01 LAB
ALBUMIN SERPL BCP-MCNC: 4 G/DL (ref 3.5–5)
ALP SERPL-CCNC: 73 U/L (ref 46–116)
ALT SERPL W P-5'-P-CCNC: 34 U/L (ref 12–78)
ANION GAP SERPL CALCULATED.3IONS-SCNC: 9 MMOL/L (ref 4–13)
AST SERPL W P-5'-P-CCNC: 18 U/L (ref 5–45)
BASOPHILS # BLD AUTO: 0.05 THOUSANDS/ΜL (ref 0–0.1)
BASOPHILS NFR BLD AUTO: 1 % (ref 0–1)
BILIRUB DIRECT SERPL-MCNC: 0.08 MG/DL (ref 0–0.2)
BILIRUB SERPL-MCNC: 0.3 MG/DL (ref 0.2–1)
BUN SERPL-MCNC: 21 MG/DL (ref 5–25)
CALCIUM SERPL-MCNC: 8.8 MG/DL (ref 8.3–10.1)
CHLORIDE SERPL-SCNC: 101 MMOL/L (ref 100–108)
CO2 SERPL-SCNC: 29 MMOL/L (ref 21–32)
CREAT SERPL-MCNC: 1.42 MG/DL (ref 0.6–1.3)
CRP SERPL QL: <3 MG/L
EOSINOPHIL # BLD AUTO: 0.31 THOUSAND/ΜL (ref 0–0.61)
EOSINOPHIL NFR BLD AUTO: 4 % (ref 0–6)
ERYTHROCYTE [DISTWIDTH] IN BLOOD BY AUTOMATED COUNT: 12.7 % (ref 11.6–15.1)
ERYTHROCYTE [SEDIMENTATION RATE] IN BLOOD: 2 MM/HOUR (ref 0–10)
GFR SERPL CREATININE-BSD FRML MDRD: 55 ML/MIN/1.73SQ M
GLUCOSE SERPL-MCNC: 97 MG/DL (ref 65–140)
HCT VFR BLD AUTO: 44.4 % (ref 36.5–49.3)
HGB BLD-MCNC: 14.8 G/DL (ref 12–17)
IMM GRANULOCYTES # BLD AUTO: 0.02 THOUSAND/UL (ref 0–0.2)
IMM GRANULOCYTES NFR BLD AUTO: 0 % (ref 0–2)
INR PPP: 0.95 (ref 0.84–1.19)
LYMPHOCYTES # BLD AUTO: 2.26 THOUSANDS/ΜL (ref 0.6–4.47)
LYMPHOCYTES NFR BLD AUTO: 28 % (ref 14–44)
MCH RBC QN AUTO: 31.4 PG (ref 26.8–34.3)
MCHC RBC AUTO-ENTMCNC: 33.3 G/DL (ref 31.4–37.4)
MCV RBC AUTO: 94 FL (ref 82–98)
MONOCYTES # BLD AUTO: 0.66 THOUSAND/ΜL (ref 0.17–1.22)
MONOCYTES NFR BLD AUTO: 8 % (ref 4–12)
NEUTROPHILS # BLD AUTO: 4.72 THOUSANDS/ΜL (ref 1.85–7.62)
NEUTS SEG NFR BLD AUTO: 59 % (ref 43–75)
NRBC BLD AUTO-RTO: 0 /100 WBCS
PLATELET # BLD AUTO: 209 THOUSANDS/UL (ref 149–390)
PMV BLD AUTO: 9.4 FL (ref 8.9–12.7)
POTASSIUM SERPL-SCNC: 3.9 MMOL/L (ref 3.5–5.3)
PROT SERPL-MCNC: 7.3 G/DL (ref 6.4–8.2)
PROTHROMBIN TIME: 12.7 SECONDS (ref 11.6–14.5)
RBC # BLD AUTO: 4.71 MILLION/UL (ref 3.88–5.62)
SODIUM SERPL-SCNC: 139 MMOL/L (ref 136–145)
WBC # BLD AUTO: 8.02 THOUSAND/UL (ref 4.31–10.16)

## 2020-01-01 PROCEDURE — 70496 CT ANGIOGRAPHY HEAD: CPT

## 2020-01-01 PROCEDURE — 80076 HEPATIC FUNCTION PANEL: CPT | Performed by: EMERGENCY MEDICINE

## 2020-01-01 PROCEDURE — 85652 RBC SED RATE AUTOMATED: CPT | Performed by: EMERGENCY MEDICINE

## 2020-01-01 PROCEDURE — 70498 CT ANGIOGRAPHY NECK: CPT

## 2020-01-01 PROCEDURE — 99284 EMERGENCY DEPT VISIT MOD MDM: CPT | Performed by: EMERGENCY MEDICINE

## 2020-01-01 PROCEDURE — 85025 COMPLETE CBC W/AUTO DIFF WBC: CPT | Performed by: EMERGENCY MEDICINE

## 2020-01-01 PROCEDURE — 99284 EMERGENCY DEPT VISIT MOD MDM: CPT

## 2020-01-01 PROCEDURE — 80048 BASIC METABOLIC PNL TOTAL CA: CPT | Performed by: EMERGENCY MEDICINE

## 2020-01-01 PROCEDURE — 36415 COLL VENOUS BLD VENIPUNCTURE: CPT | Performed by: EMERGENCY MEDICINE

## 2020-01-01 PROCEDURE — 86140 C-REACTIVE PROTEIN: CPT | Performed by: EMERGENCY MEDICINE

## 2020-01-01 PROCEDURE — 85610 PROTHROMBIN TIME: CPT | Performed by: EMERGENCY MEDICINE

## 2020-01-01 RX ADMIN — IOHEXOL 85 ML: 350 INJECTION, SOLUTION INTRAVENOUS at 22:37

## 2020-01-02 ENCOUNTER — TELEPHONE (OUTPATIENT)
Dept: NEUROLOGY | Facility: CLINIC | Age: 55
End: 2020-01-02

## 2020-01-02 NOTE — TELEPHONE ENCOUNTER
Best contact number for patient:[phone]    Emergency Contact name and number:  Josue Mills [phone]  Referring provider: Dr Zoey Augustin    Referring provider Dr Zoey Augustin Telephone:_[phone]_______________    Primary Care Provider Name:Dr Perfecto Schulz     Reason for Appointment/Dx:trigeminal neuralgia    Neurology Location patient would like to be seen:Any    Order received? Yes                                                 Records Received? Yes    Have you ever seen another Neurologist? No    Insurance Information    Insurance Name:[facility]     ID/Policy #:[id number]    Secondary Insurance:    ID/Policy#: Workman's Comp/ Accident/ School  Information      Workman's Comp/Accident/School related?  No    If yes name of Insurance company:    Date of Injury:    Open Claim:no    [address] Name and Telephone Number:    Notes:                   Appointment date: _6/9/2020____________________________

## 2020-01-02 NOTE — ED PROVIDER NOTES
History  Chief Complaint   Patient presents with    Eye Pain     Pt presents with c/o pain behind L eye socket, bother pt for "an hour or so and then goes away" increased pain today  HPI  Patient is a 49-year-old male, he reports over the last several weeks he has developed some left-sided facial pain  Patient reports behind his left eye he gets a fairly sharp somewhat stabbing pain  He reports the pain is somewhat reproducible by pushing on his upper eyebrow  He reports the pain is is searing uncomfortable fairly sharp pain  He reports that he had pain at dinner time which she described as fairly sharp more severe than previous episodes so he came to the hospital   Patient denies any visual deficit  He reports that he noticed at times his right leg seems to be weak but he has had no difficulty walking or ambulation  He denies any head injury  Denies any neck pain  Patient reports he gets this fairly stabbing somewhat sharp left-sided facial pain primarily behind his left eye socket and into his left face that seems to occur spontaneously  Past medical history Lyme disease, concussion, CaT bite  Family history noncontributory  Social history, smoker, denies drug abuse  Prior to Admission Medications   Prescriptions Last Dose Informant Patient Reported? Taking?    Ginkgo Biloba (GINKOBA PO)  Self Yes Yes   Sig: Take 1 capsule by mouth   Ginseng 100 MG CAPS  Self Yes Yes   Sig: Take 1 capsule by mouth   Omega 3 1000 MG CAPS   Yes Yes   Sig: Take by mouth   cholecalciferol (VITAMIN D3) 1,000 units tablet  Self Yes Yes   Sig: Take 2,000 Units by mouth daily   nicotine (NICODERM CQ) 14 mg/24hr TD 24 hr patch Not Taking at Unknown time  No No   Sig: Place 1 patch on the skin every 24 hours   Patient not taking: Reported on 6/11/2019   nicotine (NICODERM CQ) 21 mg/24 hr TD 24 hr patch Not Taking at Unknown time  No No   Sig: Place 1 patch on the skin every 24 hours   Patient not taking: Reported on 6/11/2019 nicotine (NICODERM CQ) 7 mg/24hr TD 24 hr patch Not Taking at Unknown time  No No   Sig: Place 1 patch on the skin every 24 hours   Patient not taking: Reported on 6/11/2019      Facility-Administered Medications: None       Past Medical History:   Diagnosis Date    Arthritis     Cat bite     Concussion     Last Assessed: 1/23/2017    H/o Lyme disease     H/O opioid abuse ([facility] )     Heart murmur     Joint pain     Seizure ([facility] )     Last Assessed 1/23/2017    Wears glasses     "cheaters"        Past Surgical History:   Procedure Laterality Date    BACK SURGERY      lumabr herniated disc repair    BACK SURGERY      LUMBAR DISCECTOMY  03/13/2014     Right L4-L5 minimslly invasive microdiscectomy for decompression with METRx system  Operating room microscope for microdissection  Right L4-L5 epidursl steroid injection with 40 mg of Depo-Medrol      OTHER SURGICAL HISTORY  2013    lymph node removed d/t cat bite     [place] COLONOSCOPY FLX DX W/COLLJ SPEC WHEN PFRMD N/A 10/30/2017    Procedure: COLONOSCOPY;  Surgeon: Amy Mulligan MD;  Location: [facility]; Service: Gastroenterology       Family History   Problem Relation Age of Onset    Anxiety disorder Mother     Other Mother         Back Disorder     Depression Mother     Emphysema Mother     Heart attack Father     Other Father         Back Disorder     Diabetes Father     Hypertension Father     Diabetes Sister      I have reviewed and agree with the history as documented  Social History     Tobacco Use    Smoking status: Current Every Day Smoker     Packs/day: 1 00     Types: Cigarettes    Smokeless tobacco: Never Used   Substance Use Topics    Alcohol use: Yes     Alcohol/week: 56 0 standard drinks     Types: 56 Cans of beer per week     Comment: social    Drug use: No     Comment: h/o drug abuse/ Per allscripts: Prescription Drug  Abuse          Review of Systems   Constitutional: Negative for diaphoresis, fatigue and fever  HENT: Negative for congestion, ear pain, facial swelling, nosebleeds and sore throat  Eyes: Negative for photophobia, pain, discharge and visual disturbance  Respiratory: Negative for cough, choking, chest tightness, shortness of breath and wheezing  Cardiovascular: Negative for chest pain and palpitations  Gastrointestinal: Negative for abdominal distention, abdominal pain, diarrhea and vomiting  Genitourinary: Negative for dysuria, flank pain and frequency  Musculoskeletal: Negative for back pain, gait problem and joint swelling  Skin: Negative for color change and rash  Neurological: Negative for dizziness, syncope, weakness and headaches  Psychiatric/Behavioral: Negative for behavioral problems and confusion  The patient is not nervous/anxious  All other systems reviewed and are negative  Reports facial pain    Physical Exam  Physical Exam   Constitutional: He is oriented to person, place, and time  He appears well-developed and well-nourished  HENT:   Head: Normocephalic  Right Ear: External ear normal    Left Ear: External ear normal    Nose: Nose normal    Mouth/Throat: Oropharynx is clear and moist    Eyes: Pupils are equal, round, and reactive to light  EOM and lids are normal    Patient's eyes are normal there appears, normal anterior chambers, no pain with palpation of the eyeballs, pressures are normal with tonometry  Normal anterior chambers no flare no hyphema   Neck: Normal range of motion  Neck supple  Cardiovascular: Normal rate, regular rhythm, normal heart sounds and intact distal pulses  Pulmonary/Chest: Effort normal and breath sounds normal  No respiratory distress  Abdominal: Soft  Bowel sounds are normal    Musculoskeletal: Normal range of motion  He exhibits no deformity  Neurological: He is alert and oriented to person, place, and time  He has normal strength  No cranial nerve deficit or sensory deficit  Coordination normal  GCS eye subscore is 4   GCS verbal subscore is 5  GCS motor subscore is 6  There is no focal weakness   Skin: Skin is warm and dry  Psychiatric: He has a normal mood and affect  Nursing note and vitals reviewed     Pulse oximetry 99% on room air adequate oxygenation there is no hypoxia  Vital Signs  ED Triage Vitals [01/01/20 1838]   Temperature Pulse Respirations Blood Pressure SpO2   (!) 97 4 °F (36 3 °C) 64 16 164/80 99 %      Temp Source Heart Rate Source Patient Position - Orthostatic VS BP Location FiO2 (%)   Oral Monitor Sitting Left arm --      Pain Score       6           Vitals:    01/01/20 1838 01/01/20 2030 01/01/20 2100 01/01/20 2200   BP: 164/80 110/69 115/72 117/74   Pulse: 64 55 56 (!) 53   Patient Position - Orthostatic VS: Sitting Lying Lying Lying         Visual Acuity  Visual Acuity      Most Recent Value   Visual acuity R eye is  20/70   Visual acuity Left eye is  20/100   Visual acuity in both eyes is  20/70   No corrective eyewear/lenses  Yes          ED Medications  Medications   iohexol (OMNIPAQUE) 350 MG/ML injection (MULTI-DOSE) 85 mL (85 mL Intravenous Given 1/1/20 2237)       Diagnostic Studies  Results Reviewed     Procedure Component Value Units Date/Time    Sedimentation rate, automated [[id number]]  (Normal) Collected:  01/01/20 2010    Lab Status:  Final result Specimen:  Blood from Arm, Right Updated:  01/01/20 2131     Sed Rate 2 mm/hour     Hepatic function panel [[id number]]  (Normal) Collected:  01/01/20 2010    Lab Status:  Final result Specimen:  Blood from Arm, Right Updated:  01/01/20 2052     Total Bilirubin 0 30 mg/dL      Bilirubin, Direct 0 08 mg/dL      Alkaline Phosphatase 73 U/L      AST 18 U/L      ALT 34 U/L      Total Protein 7 3 g/dL      Albumin 4 0 g/dL     C-reactive protein [[id number]]  (Normal) Collected:  01/01/20 2010    Lab Status:  Final result Specimen:  Blood from Arm, Right Updated:  01/01/20 2052     CRP <3 0 mg/L     Basic metabolic panel [[id number]]  (Abnormal) Collected: 01/01/20 2010    Lab Status:  Final result Specimen:  Blood from Arm, Right Updated:  01/01/20 2039     Sodium 139 mmol/L      Potassium 3 9 mmol/L      Chloride 101 mmol/L      CO2 29 mmol/L      ANION GAP 9 mmol/L      BUN 21 mg/dL      Creatinine 1 42 mg/dL      Glucose 97 mg/dL      Calcium 8 8 mg/dL      eGFR 55 ml/min/1 73sq m     Narrative:       [place] guidelines for Chronic Kidney Disease (CKD):     Stage 1 with normal or high GFR (GFR > 90 mL/min/1 73 square meters)    Stage 2 Mild CKD (GFR = 60-89 mL/min/1 73 square meters)    Stage 3A Moderate CKD (GFR = 45-59 mL/min/1 73 square meters)    Stage 3B Moderate CKD (GFR = 30-44 mL/min/1 73 square meters)    Stage 4 Severe CKD (GFR = 15-29 mL/min/1 73 square meters)    Stage 5 End Stage CKD (GFR <15 mL/min/1 73 square meters)  Note: GFR calculation is accurate only with a steady state creatinine    Protime-INR [[id number]]  (Normal) Collected:  01/01/20 2010    Lab Status:  Final result Specimen:  Blood from Arm, Right Updated:  01/01/20 2032     Protime 12 7 seconds      INR 0 95    CBC and differential [[id number]] Collected:  01/01/20 2010    Lab Status:  Final result Specimen:  Blood from Arm, Right Updated:  01/01/20 2018     WBC 8 02 Thousand/uL      RBC 4 71 Million/uL      Hemoglobin 14 8 g/dL      Hematocrit 44 4 %      MCV 94 fL      MCH 31 4 pg      MCHC 33 3 g/dL      RDW 12 7 %      MPV 9 4 fL      Platelets 037 Thousands/uL      nRBC 0 /100 WBCs      Neutrophils Relative 59 %      Immat GRANS % 0 %      Lymphocytes Relative 28 %      Monocytes Relative 8 %      Eosinophils Relative 4 %      Basophils Relative 1 %      Neutrophils Absolute 4 72 Thousands/µL      Immature Grans Absolute 0 02 Thousand/uL      Lymphocytes Absolute 2 26 Thousands/µL      Monocytes Absolute 0 66 Thousand/µL      Eosinophils Absolute 0 31 Thousand/µL      Basophils Absolute 0 05 Thousands/µL                  CTA head and neck with and without contrast   Final Result by Shaneka Carpenter MD (01/01 6637)      1  No acute intracranial hemorrhage, midline shift, or mass effect  2   Mucosal thickening within the paranasal sinuses  Correlate for sinusitis  3   Severe stenosis/occlusion at the origin of the left vertebral artery and mild short segment irregular narrowing of the left vertebral artery at the level of C2, otherwise patent  Patent right vertebral artery  4   No hemodynamically significant stenosis of the carotid arteries  5   No high-grade proximal stenosis to visualized [place]  6   No significant intracranial arteriovenous malformation or aneurysm  7   Disc space narrowing most pronounced at C5-C6 and C6-C7  Workstation performed: [id number]                    Procedures  Procedures         ED Course        with a tonometer pressure in on the patient's left eye was 10, pressure in the unaffected eye was 13  No sign of glaucoma  diagnostic testing showed normal liver functions no sign of hepatic obstruction or hepatitis, CRP was negative  No sign of inflammation  Patient's electrolytes showed a BUN of 21 and creatinine 1 42 consistent with some underlying renal insufficiency, white count was normal at 8000 no sign of inflammation, hemoglobin was normal at 14 no sign of anemia  CT CTA was done because the patient had pain behind his left eyeball rule out intracranial aneurysm optic artery aneurysm  Rule out mass or Intracranial bleeding  patient also reports some right leg weakness, CT was done to rule out CVA as the patient has had weakness for several days and a stroke should be evident at this time  CT showed no acute stroke or intracranial hemorrhage, there was some stenosis of the vertebral artery          MDM medical decision making 23-year-old male presents emergency department with left facial pain somewhat a burning stinging pain primarily behind his eyeball but also over his left face, somewhat worse with palpation of the eyebrow  Patient also reports some right leg weakness  Apparently symptoms going on over the last 2 days  CT to rule out intracranial stroke showed no stroke  There was some stenosis of the vertebral artery  I did end of my shift prior to the patient's resolved coming back and it was communicated to the patient by the oncoming physician  Patient has no CVA  There is no aneurysm  Patient is intact neurologically there is no deficit  There is no increased ocular pressure by tonometry no sign of glaucoma  Discussed with patient most consistent with a neuralgia we discussed follow-up and treatment prior to the results of his CT scan      Disposition  Final diagnoses:   Trigeminal neuralgia - Left face     Time reflects when diagnosis was documented in both MDM as applicable and the Disposition within this note     Time User Action Codes Description Comment    1/1/2020  9:15 PM Peg Brady Add [G50 0] Trigeminal neuralgia     1/1/2020  9:15 PM Peg Brady Modify [G50 0] Trigeminal neuralgia Left face      ED Disposition     ED Disposition Condition Date/Time Comment    Discharge Stable Wed Jan 1, 2020  9:22 PM Rebcea Gamboa discharge to home/self care              Follow-up Information     Follow up With Specialties Details Why [address], [place] Neurology   [address] [place] [address]    [address]  [phone]          Discharge Medication List as of 1/1/2020 11:41 PM      CONTINUE these medications which have NOT CHANGED    Details   cholecalciferol (VITAMIN D3) 1,000 units tablet Take 2,000 Units by mouth daily, Historical Med      Ginkgo Biloba (GINKOBA PO) Take 1 capsule by mouth, Historical Med      Ginseng 100 MG CAPS Take 1 capsule by mouth, Historical Med      Omega 3 1000 MG CAPS Take by mouth, Historical Med nicotine (NICODERM CQ) 14 mg/24hr TD 24 hr patch Place 1 patch on the skin every 24 hours, Starting Wed 11/21/2018, Normal      nicotine (NICODERM CQ) 21 mg/24 hr TD 24 hr patch Place 1 patch on the skin every 24 hours, Starting Wed 11/21/2018, Normal      nicotine (NICODERM CQ) 7 mg/24hr TD 24 hr patch Place 1 patch on the skin every 24 hours, Starting Wed 11/21/2018, Normal           No discharge procedures on file      ED Provider  Electronically Signed by           Enrike Orozco MD  01/02/20 8837

## 2020-01-03 ENCOUNTER — OFFICE VISIT (OUTPATIENT)
Dept: INTERNAL MEDICINE CLINIC | Facility: CLINIC | Age: 55
End: 2020-01-03
Payer: COMMERCIAL

## 2020-01-03 VITALS
SYSTOLIC BLOOD PRESSURE: 122 MMHG | DIASTOLIC BLOOD PRESSURE: 88 MMHG | WEIGHT: 210.8 LBS | HEIGHT: 71 IN | BODY MASS INDEX: 29.51 KG/M2 | OXYGEN SATURATION: 96 % | HEART RATE: 69 BPM

## 2020-01-03 DIAGNOSIS — Z72.0 TOBACCO ABUSE: ICD-10-CM

## 2020-01-03 DIAGNOSIS — I65.02 OCCLUSION OF LEFT VERTEBRAL ARTERY: Primary | ICD-10-CM

## 2020-01-03 DIAGNOSIS — N17.9 AKI (ACUTE KIDNEY INJURY) (HCC): ICD-10-CM

## 2020-01-03 DIAGNOSIS — F41.9 ANXIETY: ICD-10-CM

## 2020-01-03 DIAGNOSIS — G44.039 PAROXYSMAL HEMICRANIA: ICD-10-CM

## 2020-01-03 PROCEDURE — 3008F BODY MASS INDEX DOCD: CPT | Performed by: INTERNAL MEDICINE

## 2020-01-03 PROCEDURE — 99214 OFFICE O/P EST MOD 30 MIN: CPT | Performed by: INTERNAL MEDICINE

## 2020-01-03 RX ORDER — ESCITALOPRAM OXALATE 5 MG/1
5 TABLET ORAL DAILY
Qty: 30 TABLET | Refills: 3 | Status: SHIPPED | OUTPATIENT
Start: 2020-01-03 | End: 2020-02-18

## 2020-01-03 NOTE — PROGRESS NOTES
BMI Counseling: Body mass index is 29 4 kg/m²  The BMI is above normal  Nutrition recommendations include decreasing portion sizes  Assessment/Plan:    Occlusion of left vertebral artery  Likely incidental finding review the CT a likely occurred years ago previous injury I have advised patient never to get HVLA of the cervical spine and I will have the patient see neurosurgery I have counseled patient quit smoking    GREGOR (acute kidney injury) ([facility] [address] )  Drink adequate amounts of water, discontinue anti-inflammatories avoid sodium and will recheck the kidney function BMP in 1 week    Anxiety  Major anxiety/depression no suicidal ideation will start Lexapro 5 mg once daily reviewed the risks benefits and side effects of the medication he would like to hold off on counseling at this point time this might be the triggering event for his headache disorder    Paroxysmal hemicrania  I did review the ER notes CTA was normal patient to see Neurology currently he is asymptomatic he reports me stress/anxiety triggered his headache currently he is asymptomatic    Tobacco abuse  I have counseled the patient to quit smoking, I have recommended that the patient set up a quit date and I have asked the patient to cut down the cigarettes until the quit date           Problem List Items Addressed This Visit        Cardiovascular and Mediastinum    Occlusion of left vertebral artery - Primary     Likely incidental finding review the CT a likely occurred years ago previous injury I have advised patient never to get HVLA of the cervical spine and I will have the patient see neurosurgery I have counseled patient quit smoking         Relevant Orders    Ambulatory referral to Neurosurgery       Genitourinary    GREGOR (acute kidney injury) ([address] )     Drink adequate amounts of water, discontinue anti-inflammatories avoid sodium and will recheck the kidney function BMP in 1 week         Relevant Orders    Basic metabolic panel       Other Tobacco abuse     I have counseled the patient to quit smoking, I have recommended that the patient set up a quit date and I have asked the patient to cut down the cigarettes until the quit date  Anxiety     Major anxiety/depression no suicidal ideation will start Lexapro 5 mg once daily reviewed the risks benefits and side effects of the medication he would like to hold off on counseling at this point time this might be the triggering event for his headache disorder         Relevant Medications    escitalopram (LEXAPRO) 5 mg tablet    Paroxysmal hemicrania     I did review the ER notes CTA was normal patient to see Neurology currently he is asymptomatic he reports me stress/anxiety triggered his headache currently he is asymptomatic         Relevant Medications    escitalopram (LEXAPRO) 5 mg tablet          Return to office 1  months  call if any problems  Subjective:      Patient ID: Lilliam Ariza is a 62 y o  male  HPI 61-year old male coming in for a follow up visit regarding occlusion of the left vertebral artery, anxiety/depression, acute kidney injury, paroxysmal hemicrania and tobacco abuse; patient did go to ER for severe headache CTA was negative he had been taking anti-inflammatories to control the headache  He reports me it felt like an ice pick behind the eye  He reports me under lot of stress with work and home no SI  2 weeks ha behind the left eye ? Stress motin and tylenol  Not touch  The following portions of the patient's history were reviewed and updated as appropriate: allergies, current medications, past family history, past medical history, past social history, past surgical history and problem list   Phillip Rdz behind the eye ha and goes into the tooth, last ha on new yrs day no eye draining went away on its own  No illicit drugs    Went away with ativan  Review of Systems   Constitutional: Negative for activity change, appetite change and unexpected weight change     HENT: Negative for congestion and postnasal drip  Eyes: Negative for visual disturbance  Respiratory: Negative for cough and shortness of breath  Cardiovascular: Negative for chest pain  Gastrointestinal: Negative for abdominal pain, diarrhea, nausea and vomiting  Neurological: Positive for headaches (Episode of headache currently resolved)  Negative for dizziness and light-headedness  Hematological: Negative for adenopathy  Psychiatric/Behavioral: Negative for suicidal ideas  The patient is nervous/anxious  Mod depression       JESSIE-7 Flowsheet Screening      Most Recent Value   Over the last two weeks, how often have you been bothered by the following problems? Feeling nervous, anxious, or on edge  3   Not being able to stop or control worrying  3   Worrying too much about different things  3   Trouble relaxing   0   Being so restless that it's hard to sit still  3   Becoming easily annoyed or irritable   3   Feeling afraid as if something awful might happen  0   How difficult have these problems made it for you to do your work, take care of things at home, or get along with other people? Extremely difficult   JESSIE Score   15        JESSIE-7 Flowsheet Screening      Most Recent Value   Over the last two weeks, how often have you been bothered by the following problems? Feeling nervous, anxious, or on edge  3   Not being able to stop or control worrying  3   Worrying too much about different things  3   Trouble relaxing   0   Being so restless that it's hard to sit still  3   Becoming easily annoyed or irritable   3   Feeling afraid as if something awful might happen  0   How difficult have these problems made it for you to do your work, take care of things at home, or get along with other people? Extremely difficult   JESSIE Score   15        Objective:    No follow-ups on file      Procedure: Cta Head And Neck With And Without Contrast    Result Date: 1/1/2020  Narrative: CTA NECK AND BRAIN WITH AND WITHOUT CONTRAST INDICATION: Pain by left eyeball, rule out aneurysm, right leg weakness COMPARISON:   None  TECHNIQUE:  Routine CT imaging of the Brain without contrast   Post contrast imaging was performed after administration of iodinated contrast through the neck and brain  Post contrast axial 0 625 mm images timed to opacify the arterial system  3D rendering was performed on an independent workstation  MIP reconstructions performed  Coronal reconstructions were performed of the noncontrast portion of the brain  Radiation dose length product (DLP) for this visit:  1168 29 mGy-cm   This examination, like all CT scans performed in the [facility], was performed utilizing techniques to minimize radiation dose exposure, including the use of iterative reconstruction and automated exposure control  IV Contrast:  85 mL of iohexol (OMNIPAQUE) was administered intravenously without immediate adverse reaction  IMAGE QUALITY:   Diagnostic FINDINGS: NONCONTRAST BRAIN PARENCHYMA:  No intracranial mass, mass effect or midline shift  No CT signs of acute infarction  No acute parenchymal hemorrhage  VENTRICLES AND EXTRA-AXIAL SPACES:  Normal for the patient's age  VISUALIZED ORBITS AND PARANASAL SINUSES:  Mucosal thickening within the right greater than left maxillary sinuses and scattered ethmoid air cells  Mild mucosal thickening in the left frontal sinus  Mastoid air cells and middle ear cavities are clear  CERVICAL VASCULATURE AORTIC ARCH AND GREAT VESSELS:  Mild atherosclerotic disease of the arch, proximal great vessels and visualized subclavian vessels  No significant stenosis  RIGHT VERTEBRAL ARTERY CERVICAL SEGMENT:  Normal origin  The vessel is normal in caliber throughout the neck  LEFT VERTEBRAL ARTERY CERVICAL SEGMENT:  Severe stenosis/occlusion at the origin of the left vertebral artery    Mild short segment irregular narrowing of the left vertebral at the level of C2 (series 2, image 276)  RIGHT EXTRACRANIAL CAROTID SEGMENT:  Normal caliber common carotid artery  A portion of the right common carotid artery is not visible due to streak artifact  Normal bifurcation and cervical internal carotid artery  No stenosis or dissection  LEFT EXTRACRANIAL CAROTID SEGMENT:  Mild atherosclerotic disease of the distal common carotid artery and proximal cervical internal carotid artery without significant stenosis compared to the more distal ICA  NASCET criteria was used to determine the degree of internal carotid artery diameter stenosis  INTRACRANIAL VASCULATURE INTERNAL CAROTID ARTERIES:  Normal enhancement of the intracranial portions of the internal carotid arteries  Normal ophthalmic artery origins  Normal ICA terminus  ANTERIOR CIRCULATION:  Hypoplastic or absent right A1 segment  A2 segments arise predominantly from the left A1 segment  Normal anterior communicating artery  MIDDLE CEREBRAL ARTERY CIRCULATION:  M1 segment and middle cerebral artery branches demonstrate normal enhancement bilaterally  DISTAL VERTEBRAL ARTERIES:  Normal distal vertebral arteries  Posterior inferior cerebellar artery origins are normal  Normal vertebral basilar junction  BASILAR ARTERY:  Basilar artery is normal in caliber  Normal superior cerebellar arteries  POSTERIOR CEREBRAL ARTERIES: Both posterior cerebral arteries arises from the basilar tip  Both arteries demonstrate normal enhancement  Normal posterior communicating arteries  DURAL VENOUS SINUSES:  Normal  NON VASCULAR ANATOMY BONY STRUCTURES:  No acute osseous abnormality  Reversal of expected cervical lordosis  Disc space narrowing is most pronounced at C5-C6 and C6-C7  SOFT TISSUES OF THE NECK:  Normal  THORACIC INLET:  Unremarkable  Impression: 1  No acute intracranial hemorrhage, midline shift, or mass effect  2   Mucosal thickening within the paranasal sinuses  Correlate for sinusitis   3   Severe stenosis/occlusion at the origin of the left vertebral artery and mild short segment irregular narrowing of the left vertebral artery at the level of C2, otherwise patent  Patent right vertebral artery  4   No hemodynamically significant stenosis of the carotid arteries  5   No high-grade proximal stenosis to visualized [place]  6   No significant intracranial arteriovenous malformation or aneurysm  7   Disc space narrowing most pronounced at C5-C6 and C6-C7  Workstation performed: [id number]         No Known Allergies    Past Medical History:   Diagnosis Date    Arthritis     Cat bite     Concussion     Last Assessed: 1/23/2017    H/o Lyme disease     H/O opioid abuse ([facility] [address] )     Heart murmur     Joint pain     Seizure ([facility] [address] )     Last Assessed 1/23/2017    Wears glasses     "cheaters"      Past Surgical History:   Procedure Laterality Date    BACK SURGERY      lumabr herniated disc repair    BACK SURGERY      LUMBAR DISCECTOMY  03/13/2014     Right L4-L5 minimslly invasive microdiscectomy for decompression with METRx system  Operating room microscope for microdissection  Right L4-L5 epidursl steroid injection with 40 mg of Depo-Medrol      OTHER SURGICAL HISTORY  2013    lymph node removed d/t cat bite     IN COLONOSCOPY FLX DX W/COLLJ SPEC WHEN PFRMD N/A 10/30/2017    Procedure: COLONOSCOPY;  Surgeon: Luis Carrillo MD;  Location: [facility];   Service: Gastroenterology     Current Outpatient Medications on File Prior to Visit   Medication Sig Dispense Refill    cholecalciferol (VITAMIN D3) 1,000 units tablet Take 2,000 Units by mouth daily      Ginkgo Biloba (GINKOBA PO) Take 1 capsule by mouth      Ginseng 100 MG CAPS Take 1 capsule by mouth      Omega 3 1000 MG CAPS Take by mouth      nicotine (NICODERM CQ) 14 mg/24hr TD 24 hr patch Place 1 patch on the skin every 24 hours 14 patch 0    nicotine (NICODERM CQ) 21 mg/24 hr TD 24 hr patch Place 1 patch on the skin every 24 hours 28 patch 0    nicotine (NICODERM CQ) 7 mg/24hr TD 24 hr patch Place 1 patch on the skin every 24 hours (Patient not taking: Reported on 6/11/2019) 14 patch 0     No current facility-administered medications on file prior to visit  Family History   Problem Relation Age of Onset    Anxiety disorder Mother     Other Mother         Back Disorder     Depression Mother     Emphysema Mother     Heart attack Father     Other Father         Back Disorder     Diabetes Father     Hypertension Father     Diabetes Sister      Social History     Socioeconomic History    Marital status: /Civil Union     Spouse name: Not on file    Number of children: Not on file    Years of education: Not on file    Highest education level: Not on file   Occupational History    Not on file   Social Needs    Financial resource strain: Not on file    Food insecurity:     Worry: Not on file     Inability: Not on file    Transportation needs:     Medical: Not on file     Non-medical: Not on file   Tobacco Use    Smoking status: Current Every Day Smoker     Packs/day: 1 00     Types: Cigarettes    Smokeless tobacco: Never Used   Substance and Sexual Activity    Alcohol use:  Yes     Alcohol/week: 56 0 standard drinks     Types: 56 Cans of beer per week     Comment: social    Drug use: No     Comment: h/o drug abuse/ Per allscripts: Prescription Drug  Abuse      Sexual activity: Not on file   Lifestyle    Physical activity:     Days per week: Not on file     Minutes per session: Not on file    Stress: Not on file   Relationships    Social connections:     Talks on phone: Not on file     Gets together: Not on file     Attends [employer] service: Not on file     Active member of club or organization: Not on file     Attends meetings of clubs or organizations: Not on file     Relationship status: Not on file    Intimate partner violence:     Fear of current or ex partner: Not on file     Emotionally abused: Not on file     Physically abused: Not on file Forced sexual activity: Not on file   Other Topics Concern    Not on file   Social History Narrative    Per Allscripts:     Always uses seat belt     Daily Caffeine consumption     Employed    Full-time employment     Graduted from high school     Lives independently with spouse     Living with and caring for child      Vitals:    01/03/20 1329   BP: 122/88   Pulse: 69   SpO2: 96%   Weight: 95 6 kg (210 lb 12 8 oz)   Height: 5' 11" (1 803 m)     Results for orders placed or performed during the hospital encounter of 01/01/20   CBC and differential   Result Value Ref Range    WBC 8 02 4 31 - 10 16 Thousand/uL    RBC 4 71 3 88 - 5 62 Million/uL    Hemoglobin 14 8 12 0 - 17 0 g/dL    Hematocrit 44 4 36 5 - 49 3 %    MCV 94 82 - 98 fL    MCH 31 4 26 8 - 34 3 pg    MCHC 33 3 31 4 - 37 4 g/dL    RDW 12 7 11 6 - 15 1 %    MPV 9 4 8 9 - 12 7 fL    Platelets 202 553 - 025 Thousands/uL    nRBC 0 /100 WBCs    Neutrophils Relative 59 43 - 75 %    Immat GRANS % 0 0 - 2 %    Lymphocytes Relative 28 14 - 44 %    Monocytes Relative 8 4 - 12 %    Eosinophils Relative 4 0 - 6 %    Basophils Relative 1 0 - 1 %    Neutrophils Absolute 4 72 1 85 - 7 62 Thousands/µL    Immature Grans Absolute 0 02 0 00 - 0 20 Thousand/uL    Lymphocytes Absolute 2 26 0 60 - 4 47 Thousands/µL    Monocytes Absolute 0 66 0 17 - 1 22 Thousand/µL    Eosinophils Absolute 0 31 0 00 - 0 61 Thousand/µL    Basophils Absolute 0 05 0 00 - 0 10 Thousands/µL   Basic metabolic panel   Result Value Ref Range    Sodium 139 136 - 145 mmol/L    Potassium 3 9 3 5 - 5 3 mmol/L    Chloride 101 100 - 108 mmol/L    CO2 29 21 - 32 mmol/L    ANION GAP 9 4 - 13 mmol/L    BUN 21 5 - 25 mg/dL    Creatinine 1 42 (H) 0 60 - 1 30 mg/dL    Glucose 97 65 - 140 mg/dL    Calcium 8 8 8 3 - 10 1 mg/dL    eGFR 55 ml/min/1 73sq m   Protime-INR   Result Value Ref Range    Protime 12 7 11 6 - 14 5 seconds    INR 0 95 0 84 - 1 19   Hepatic function panel   Result Value Ref Range    Total Bilirubin 0 30 0 20 - 1 00 mg/dL    Bilirubin, Direct 0 08 0 00 - 0 20 mg/dL    Alkaline Phosphatase 73 46 - 116 U/L    AST 18 5 - 45 U/L    ALT 34 12 - 78 U/L    Total Protein 7 3 6 4 - 8 2 g/dL    Albumin 4 0 3 5 - 5 0 g/dL   C-reactive protein   Result Value Ref Range    CRP <3 0 <3 0 mg/L   Sedimentation rate, automated   Result Value Ref Range    Sed Rate 2 0 - 10 mm/hour     Weight (last 2 days)     Date/Time   Weight    01/03/20 1329   95 6 (210 8)            Body mass index is 29 4 kg/m²  BP      Temp      Pulse     Resp      SpO2        Vitals:    01/03/20 1329   Weight: 95 6 kg (210 lb 12 8 oz)     Vitals:    01/03/20 1329   Weight: 95 6 kg (210 lb 12 8 oz)       /88   Pulse 69   Ht 5' 11" (1 803 m)   Wt 95 6 kg (210 lb 12 8 oz)   SpO2 96%   BMI 29 40 kg/m²          Physical Exam   Constitutional: He appears well-developed and well-nourished  No distress  HENT:   Head: Normocephalic and atraumatic  Right Ear: External ear normal    Left Ear: External ear normal    Mouth/Throat: Oropharynx is clear and moist    Eyes: Pupils are equal, round, and reactive to light  Conjunctivae are normal  Right eye exhibits no discharge  Left eye exhibits no discharge  No scleral icterus  Neck: Neck supple  Cardiovascular: Normal rate, regular rhythm and normal heart sounds  Exam reveals no gallop and no friction rub  No murmur heard  Pulmonary/Chest: No respiratory distress  He has no wheezes  He has no rales  Abdominal: Soft  Bowel sounds are normal  He exhibits no distension and no mass  There is no tenderness  There is no rebound and no guarding  Musculoskeletal: He exhibits no edema or deformity  Lymphadenopathy:     He has no cervical adenopathy  Neurological: He is alert  No cranial nerve deficit  Skin: He is not diaphoretic  Psychiatric: His mood appears anxious  He exhibits a depressed mood  He expresses no suicidal ideation

## 2020-01-05 PROBLEM — I65.02 OCCLUSION OF LEFT VERTEBRAL ARTERY: Status: ACTIVE | Noted: 2020-01-05

## 2020-01-05 PROBLEM — G44.039 PAROXYSMAL HEMICRANIA: Status: ACTIVE | Noted: 2020-01-05

## 2020-01-05 PROBLEM — N17.9 AKI (ACUTE KIDNEY INJURY) (HCC): Status: ACTIVE | Noted: 2020-01-05

## 2020-01-05 PROBLEM — F41.9 ANXIETY: Status: ACTIVE | Noted: 2020-01-05

## 2020-01-05 NOTE — ASSESSMENT & PLAN NOTE
Major anxiety/depression no suicidal ideation will start Lexapro 5 mg once daily reviewed the risks benefits and side effects of the medication he would like to hold off on counseling at this point time this might be the triggering event for his headache disorder

## 2020-01-05 NOTE — ASSESSMENT & PLAN NOTE
Likely incidental finding review the CT a likely occurred years ago previous injury I have advised patient never to get HVLA of the cervical spine and I will have the patient see neurosurgery I have counseled patient quit smoking Benefits, risks, and possible complications of procedure explained to patient/caregiver who verbalized understanding and gave verbal consent.

## 2020-01-05 NOTE — ASSESSMENT & PLAN NOTE
Drink adequate amounts of water, discontinue anti-inflammatories avoid sodium and will recheck the kidney function BMP in 1 week

## 2020-01-05 NOTE — ASSESSMENT & PLAN NOTE
I did review the ER notes CTA was normal patient to see Neurology currently he is asymptomatic he reports me stress/anxiety triggered his headache currently he is asymptomatic

## 2020-02-10 ENCOUNTER — CONSULT (OUTPATIENT)
Dept: NEUROSURGERY | Facility: CLINIC | Age: 55
End: 2020-02-10
Payer: COMMERCIAL

## 2020-02-10 VITALS
TEMPERATURE: 97.5 F | HEART RATE: 70 BPM | RESPIRATION RATE: 16 BRPM | SYSTOLIC BLOOD PRESSURE: 118 MMHG | WEIGHT: 211 LBS | DIASTOLIC BLOOD PRESSURE: 76 MMHG | HEIGHT: 71 IN | BODY MASS INDEX: 29.54 KG/M2

## 2020-02-10 DIAGNOSIS — I65.02 OCCLUSION OF LEFT VERTEBRAL ARTERY: ICD-10-CM

## 2020-02-10 PROCEDURE — 99245 OFF/OP CONSLTJ NEW/EST HI 55: CPT | Performed by: RADIOLOGY

## 2020-02-10 RX ORDER — ATORVASTATIN CALCIUM 10 MG/1
10 TABLET, FILM COATED ORAL DAILY
Qty: 30 TABLET | Refills: 1 | Status: SHIPPED | OUTPATIENT
Start: 2020-02-10 | End: 2020-04-17

## 2020-02-10 NOTE — PROGRESS NOTES
Assessment/Plan:     Diagnoses and all orders for this visit:    Occlusion of left vertebral artery  -     Ambulatory referral to Neurosurgery  -     atorvastatin (LIPITOR) 10 mg tablet; Take 1 tablet (10 mg total) by mouth daily        Discussion Summary:   Mr Caroline Jamison has left vertebral artery origin stenosis vs occlusion  In my review of the CTA, he additionally has right vertebral artery origin stenosis  This appears to all be asymptomatic  He should initiate aspiring and statin therapy  He was counseled on smoking cessation  If this stenosis becomes symptomatic, he may require stenting  He was instructed to go to the ER immediately if experiencing signs of a stroke  No further follow-up is required at this time  Chief Complaint: Consult (Occlusion of the left vertebral artery)      Patient ID: Kaleb Barrera is a 62 y o  male    HPI  Mr Caroline Jamison presents for evaluation of vertebral artery stenosis  He reports he was suffering recently from severe headaches  This led to an ED admission and a CTA which reported left vertebral artery stenosis, possibly occlusion  Headaches improved, nearly resolved currently  Denies any vision disturbances such as loss of sight or double vision  No vertigo, dizziness or nausea vomiting  No syncopal or presyncopal episodes  No other stroke-like symptoms  Works in a /garage  Long time ago he use to experience symptoms of almost passing out and getting dizzy while on his back on a roller with his neck extended out  No longer performs this activity  Heavy smoker since sober from alcohol  Currently a 1 Pack/day smoker  Review of Systems   Constitutional: Negative  HENT: Negative  Eyes: Positive for visual disturbance (blurry vision)  Respiratory: Negative  Cardiovascular: Negative  Gastrointestinal: Negative  Endocrine: Negative  Genitourinary: Negative  Musculoskeletal: Negative  Skin: Negative      Allergic/Immunologic: Negative  Neurological: Positive for dizziness (when he lays onhis back and looks up ) and numbness (toes once in a while)  Negative for tremors, seizures, speech difficulty, weakness, light-headedness and headaches  Hematological: Negative  Psychiatric/Behavioral: Negative  I have personally reviewed the MA's review of systems and made changes as necessary  The following portions of the patient's history were reviewed and updated as appropriate: allergies, current medications, past family history, past medical history, past social history, past surgical history and problem list       Active Ambulatory Problems     Diagnosis Date Noted    Hypertriglyceridemia 01/19/2018    Arthritis of right acromioclavicular joint 04/24/2018    Tobacco abuse 11/22/2018    Chest pain 11/22/2018    Encounter for hepatitis C screening test for low risk patient 11/22/2018    Need for 23-polyvalent pneumococcal polysaccharide vaccine 17/75/6860    Uncomplicated opioid dependence ([facility] [address] ) 01/23/2017    Prediabetes 11/22/2018    Axillary mass, left 04/05/2019    Occlusion of left vertebral artery 01/05/2020    RGEGOR (acute kidney injury) ([facility] [address] ) 01/05/2020    Anxiety 01/05/2020    Paroxysmal hemicrania 01/05/2020     Resolved Ambulatory Problems     Diagnosis Date Noted    Anxiety 04/12/2017    Depression 01/23/2017     Past Medical History:   Diagnosis Date    Arthritis     Cat bite     Concussion     H/o Lyme disease     H/O opioid abuse ([facility] [address] )     Heart murmur     Joint pain     Seizure ([facility] [address] )     Wears glasses        Past Surgical History:   Procedure Laterality Date    BACK SURGERY      lumabr herniated disc repair    BACK SURGERY      LUMBAR DISCECTOMY  03/13/2014     Right L4-L5 minimslly invasive microdiscectomy for decompression with METRx system  Operating room microscope for microdissection   Right L4-L5 epidursl steroid injection with 40 mg of Depo-Medrol      OTHER SURGICAL HISTORY  2013 lymph node removed d/t cat bite     WA COLONOSCOPY FLX DX W/COLLJ SPEC WHEN PFRMD N/A 10/30/2017    Procedure: COLONOSCOPY;  Surgeon: Rd Mccloud MD;  Location: [facility]; Service: Gastroenterology         Vitals:    02/10/20 1500   BP: 118/76   Pulse: 70   Resp: 16   Temp: 97 5 °F (36 4 °C)         Objective:    Physical Exam   Constitutional: He is oriented to person, place, and time  He appears well-developed and well-nourished  HENT:   Head: Normocephalic and atraumatic  Eyes: Pupils are equal, round, and reactive to light  EOM are normal    Cardiovascular: Normal rate and intact distal pulses  Musculoskeletal: Normal range of motion  He exhibits no edema, tenderness or deformity  Neurological: He is alert and oriented to person, place, and time  No cranial nerve deficit or sensory deficit  He exhibits normal muscle tone  Coordination normal    Skin: Skin is warm and dry  Psychiatric: He has a normal mood and affect  His behavior is normal  Judgment and thought content normal      Neurologic Exam     Mental Status   Oriented to person, place, and time  Cranial Nerves     CN III, IV, VI   Pupils are equal, round, and reactive to light  Extraocular motions are normal        Results/Data:  I have reviewed the results and images from the CTA in detail with the patient

## 2020-02-11 RX ORDER — ASPIRIN 81 MG/1
81 TABLET ORAL DAILY
Qty: 30 TABLET | Refills: 0 | Status: SHIPPED | OUTPATIENT
Start: 2020-02-11 | End: 2020-04-17

## 2020-02-18 ENCOUNTER — OFFICE VISIT (OUTPATIENT)
Dept: INTERNAL MEDICINE CLINIC | Facility: CLINIC | Age: 55
End: 2020-02-18
Payer: COMMERCIAL

## 2020-02-18 VITALS
TEMPERATURE: 98.1 F | RESPIRATION RATE: 16 BRPM | SYSTOLIC BLOOD PRESSURE: 122 MMHG | BODY MASS INDEX: 29.88 KG/M2 | HEART RATE: 64 BPM | OXYGEN SATURATION: 98 % | HEIGHT: 71 IN | DIASTOLIC BLOOD PRESSURE: 84 MMHG | WEIGHT: 213.4 LBS

## 2020-02-18 DIAGNOSIS — N17.9 AKI (ACUTE KIDNEY INJURY) (HCC): ICD-10-CM

## 2020-02-18 DIAGNOSIS — F41.9 ANXIETY: ICD-10-CM

## 2020-02-18 DIAGNOSIS — I65.02 OCCLUSION OF LEFT VERTEBRAL ARTERY: ICD-10-CM

## 2020-02-18 DIAGNOSIS — Z72.0 TOBACCO ABUSE: Primary | ICD-10-CM

## 2020-02-18 PROCEDURE — 3008F BODY MASS INDEX DOCD: CPT | Performed by: INTERNAL MEDICINE

## 2020-02-18 PROCEDURE — 99214 OFFICE O/P EST MOD 30 MIN: CPT | Performed by: INTERNAL MEDICINE

## 2020-02-18 RX ORDER — VARENICLINE TARTRATE 25 MG
KIT ORAL
Qty: 53 TABLET | Refills: 0 | Status: SHIPPED | OUTPATIENT
Start: 2020-02-18 | End: 2020-04-17

## 2020-02-18 RX ORDER — ESCITALOPRAM OXALATE 10 MG/1
10 TABLET ORAL DAILY
Qty: 30 TABLET | Refills: 6 | Status: SHIPPED | OUTPATIENT
Start: 2020-02-18 | End: 2020-09-02

## 2020-02-18 NOTE — PROGRESS NOTES
Assessment/Plan:    Occlusion of left vertebral artery  He has seen Neurosurgery now on aspirin 81 mg once daily he has not started it but he will do so after today's visit also on a statin I review the Neurosurgery report currently is asymptomatic if he develops any stroke-like symptoms go immediately to the ER  Avoid chiropractor    GREGOR (acute kidney injury) ([facility] [address] )  Secondary to anti-inflammatories at this point time the patient has discontinue the anti inflammatory drink adequate amounts of water go for laboratories reduce sodium do not start Chantix until we verify the kidney function is stable    Anxiety  Improving he still has mild symptoms will increase Lexapro to 10 mg once daily he is tolerating the medication very well no SI he does not want counseling    Tobacco abuse  I have counseled the patient to quit smoking, I have recommended that the patient set up a quit date and I have asked the patient to cut down the cigarettes until the quit date  Patient request Chantix as directed          Problem List Items Addressed This Visit        Cardiovascular and Mediastinum    Occlusion of left vertebral artery     He has seen Neurosurgery now on aspirin 81 mg once daily he has not started it but he will do so after today's visit also on a statin I review the Neurosurgery report currently is asymptomatic if he develops any stroke-like symptoms go immediately to the ER  Avoid chiropractor            Genitourinary    GREGOR (acute kidney injury) ([facility] [address] )     Secondary to anti-inflammatories at this point time the patient has discontinue the anti inflammatory drink adequate amounts of water go for laboratories reduce sodium do not start Chantix until we verify the kidney function is stable            Other    Tobacco abuse - Primary     I have counseled the patient to quit smoking, I have recommended that the patient set up a quit date and I have asked the patient to cut down the cigarettes until the quit date  Patient request Chantix as directed          Relevant Medications    varenicline (CHANTIX ROBERTA) 0 5 MG X 11 & 1 MG X 42 tablet    Anxiety     Improving he still has mild symptoms will increase Lexapro to 10 mg once daily he is tolerating the medication very well no SI he does not want counseling         Relevant Medications    escitalopram (LEXAPRO) 10 mg tablet          Return to office 3  months  call if any problems  Subjective:      Patient ID: Kianna Dowd is a 62 y o  male  HPI 61-year old male coming in for a follow up visit regarding tobacco abuse, anxiety, left occluded vertebral artery, acute kidney failure; The patient reports me compliant taking medications without untoward side effects the  The patient is here to review his medical condition, update me on the medical condition and the patient reports me no hospitalizations and no ER visits  He reports me is not using anti-inflammatories anymore he had been taking them for 2 months straight  He reports me he is interested in quitting smoking and would like Chantix he reports me his mood is improved in that he has mild anxiety improved with the Lexapro he is tolerating the Lexapro well  The following portions of the patient's history were reviewed and updated as appropriate: allergies, current medications, past family history, past medical history, past social history, past surgical history and problem list     Review of Systems   Constitutional: Negative for activity change, appetite change and unexpected weight change  HENT: Negative for congestion and postnasal drip  Eyes: Negative for visual disturbance  Respiratory: Negative for cough and shortness of breath  Cardiovascular: Negative for chest pain  Gastrointestinal: Negative for abdominal pain, diarrhea, nausea and vomiting  Neurological: Negative for dizziness, light-headedness and headaches  Hematological: Negative for adenopathy     Psychiatric/Behavioral: Negative for suicidal ideas  Mild depression improving         Objective:    No follow-ups on file  No results found  No Known Allergies    Past Medical History:   Diagnosis Date    Arthritis     Cat bite     Concussion     Last Assessed: 1/23/2017    H/o Lyme disease     H/O opioid abuse ([facility] [address] )     Heart murmur     Joint pain     Seizure ([facility] [address] )     Last Assessed 1/23/2017    Wears glasses     "cheaters"      Past Surgical History:   Procedure Laterality Date    BACK SURGERY      lumabr herniated disc repair    BACK SURGERY      LUMBAR DISCECTOMY  03/13/2014     Right L4-L5 minimslly invasive microdiscectomy for decompression with METRx system  Operating room microscope for microdissection  Right L4-L5 epidursl steroid injection with 40 mg of Depo-Medrol      OTHER SURGICAL HISTORY  2013    lymph node removed d/t cat bite     IN COLONOSCOPY FLX DX W/COLLJ SPEC WHEN PFRMD N/A 10/30/2017    Procedure: COLONOSCOPY;  Surgeon: Madelin Boyd MD;  Location: [facility];   Service: Gastroenterology     Current Outpatient Medications on File Prior to Visit   Medication Sig Dispense Refill    aspirin (ECOTRIN LOW STRENGTH) 81 mg EC tablet Take 1 tablet (81 mg total) by mouth daily 30 tablet 0    atorvastatin (LIPITOR) 10 mg tablet Take 1 tablet (10 mg total) by mouth daily 30 tablet 1    cholecalciferol (VITAMIN D3) 1,000 units tablet Take 2,000 Units by mouth daily      Ginkgo Biloba (GINKOBA PO) Take 1 capsule by mouth      Ginseng 100 MG CAPS Take 1 capsule by mouth      Omega 3 1000 MG CAPS Take by mouth      [DISCONTINUED] escitalopram (LEXAPRO) 5 mg tablet Take 1 tablet (5 mg total) by mouth daily 30 tablet 3    nicotine (NICODERM CQ) 14 mg/24hr TD 24 hr patch Place 1 patch on the skin every 24 hours (Patient not taking: Reported on 2/10/2020) 14 patch 0    nicotine (NICODERM CQ) 21 mg/24 hr TD 24 hr patch Place 1 patch on the skin every 24 hours (Patient not taking: Reported on 2/10/2020) 28 patch 0    nicotine (NICODERM CQ) 7 mg/24hr TD 24 hr patch Place 1 patch on the skin every 24 hours (Patient not taking: Reported on 6/11/2019) 14 patch 0     No current facility-administered medications on file prior to visit        Family History   Problem Relation Age of Onset    Anxiety disorder Mother     Other Mother         Back Disorder     Depression Mother     Emphysema Mother     Heart attack Father     Other Father         Back Disorder     Diabetes Father     Hypertension Father     Diabetes Sister      Social History     Socioeconomic History    Marital status: /Civil Union     Spouse name: Not on file    Number of children: Not on file    Years of education: Not on file    Highest education level: Not on file   Occupational History    Not on file   Social Needs    Financial resource strain: Not on file    Food insecurity:     Worry: Not on file     Inability: Not on file    Transportation needs:     Medical: Not on file     Non-medical: Not on file   Tobacco Use    Smoking status: Current Every Day Smoker     Packs/day: 1 00     Types: Cigarettes    Smokeless tobacco: Never Used   Substance and Sexual Activity    Alcohol use: Not Currently     Alcohol/week: 56 0 standard drinks     Types: 56 Cans of beer per week     Frequency: Never     Comment: not for the past couple of years    Drug use: No     Comment: h/o drug abuse/ Per allscripts: Prescription Drug  Abuse      Sexual activity: Not on file   Lifestyle    Physical activity:     Days per week: Not on file     Minutes per session: Not on file    Stress: Not on file   Relationships    Social connections:     Talks on phone: Not on file     Gets together: Not on file     Attends Protestant service: Not on file     Active member of club or organization: Not on file     Attends meetings of clubs or organizations: Not on file     Relationship status: Not on file    Intimate partner violence:     Fear of current or ex partner: Not on file     Emotionally abused: Not on file     Physically abused: Not on file     Forced sexual activity: Not on file   Other Topics Concern    Not on file   Social History Narrative    Per Allscripts:     Always uses seat belt     Daily Caffeine consumption     Employed    Full-time employment     Graduted from high school     Lives independently with spouse     Living with and caring for child      Vitals:    02/18/20 0900   BP: 122/84   Pulse: 64   Resp: 16   Temp: 98 1 °F (36 7 °C)   TempSrc: Oral   SpO2: 98%   Weight: 96 8 kg (213 lb 6 4 oz)   Height: 5' 11" (1 803 m)     Results for orders placed or performed during the hospital encounter of 01/01/20   CBC and differential   Result Value Ref Range    WBC 8 02 4 31 - 10 16 Thousand/uL    RBC 4 71 3 88 - 5 62 Million/uL    Hemoglobin 14 8 12 0 - 17 0 g/dL    Hematocrit 44 4 36 5 - 49 3 %    MCV 94 82 - 98 fL    MCH 31 4 26 8 - 34 3 pg    MCHC 33 3 31 4 - 37 4 g/dL    RDW 12 7 11 6 - 15 1 %    MPV 9 4 8 9 - 12 7 fL    Platelets 010 681 - 241 Thousands/uL    nRBC 0 /100 WBCs    Neutrophils Relative 59 43 - 75 %    Immat GRANS % 0 0 - 2 %    Lymphocytes Relative 28 14 - 44 %    Monocytes Relative 8 4 - 12 %    Eosinophils Relative 4 0 - 6 %    Basophils Relative 1 0 - 1 %    Neutrophils Absolute 4 72 1 85 - 7 62 Thousands/µL    Immature Grans Absolute 0 02 0 00 - 0 20 Thousand/uL    Lymphocytes Absolute 2 26 0 60 - 4 47 Thousands/µL    Monocytes Absolute 0 66 0 17 - 1 22 Thousand/µL    Eosinophils Absolute 0 31 0 00 - 0 61 Thousand/µL    Basophils Absolute 0 05 0 00 - 0 10 Thousands/µL   Basic metabolic panel   Result Value Ref Range    Sodium 139 136 - 145 mmol/L    Potassium 3 9 3 5 - 5 3 mmol/L    Chloride 101 100 - 108 mmol/L    CO2 29 21 - 32 mmol/L    ANION GAP 9 4 - 13 mmol/L    BUN 21 5 - 25 mg/dL    Creatinine 1 42 (H) 0 60 - 1 30 mg/dL    Glucose 97 65 - 140 mg/dL    Calcium 8 8 8 3 - 10 1 mg/dL    eGFR 55 ml/min/1 73sq m   Protime-INR Result Value Ref Range    Protime 12 7 11 6 - 14 5 seconds    INR 0 95 0 84 - 1 19   Hepatic function panel   Result Value Ref Range    Total Bilirubin 0 30 0 20 - 1 00 mg/dL    Bilirubin, Direct 0 08 0 00 - 0 20 mg/dL    Alkaline Phosphatase 73 46 - 116 U/L    AST 18 5 - 45 U/L    ALT 34 12 - 78 U/L    Total Protein 7 3 6 4 - 8 2 g/dL    Albumin 4 0 3 5 - 5 0 g/dL   C-reactive protein   Result Value Ref Range    CRP <3 0 <3 0 mg/L   Sedimentation rate, automated   Result Value Ref Range    Sed Rate 2 0 - 10 mm/hour     Weight (last 2 days)     Date/Time   Weight    02/18/20 0900   96 8 (213 4)            Body mass index is 29 76 kg/m²  BP      Temp      Pulse     Resp      SpO2        Vitals:    02/18/20 0900   Weight: 96 8 kg (213 lb 6 4 oz)     Vitals:    02/18/20 0900   Weight: 96 8 kg (213 lb 6 4 oz)       /84   Pulse 64   Temp 98 1 °F (36 7 °C) (Oral)   Resp 16   Ht 5' 11" (1 803 m)   Wt 96 8 kg (213 lb 6 4 oz)   SpO2 98%   BMI 29 76 kg/m²          Physical Exam   Constitutional: He appears well-developed and well-nourished  No distress  HENT:   Head: Normocephalic and atraumatic  Right Ear: External ear normal    Left Ear: External ear normal    Mouth/Throat: Oropharynx is clear and moist    Eyes: Pupils are equal, round, and reactive to light  Conjunctivae are normal  Right eye exhibits no discharge  Left eye exhibits no discharge  No scleral icterus  Neck: Neck supple  Cardiovascular: Normal rate, regular rhythm and normal heart sounds  Exam reveals no gallop and no friction rub  No murmur heard  Pulmonary/Chest: No respiratory distress  He has no wheezes  He has no rales  Abdominal: Soft  Bowel sounds are normal  He exhibits no distension and no mass  There is no tenderness  There is no rebound and no guarding  Musculoskeletal: He exhibits no edema or deformity  Lymphadenopathy:     He has no cervical adenopathy  Neurological: He is alert  Skin: He is not diaphoretic  Psychiatric: His mood appears anxious  He does not exhibit a depressed mood  He expresses no suicidal ideation

## 2020-02-19 NOTE — ASSESSMENT & PLAN NOTE
I have counseled the patient to quit smoking, I have recommended that the patient set up a quit date and I have asked the patient to cut down the cigarettes until the quit date    Patient request Chantix as directed

## 2020-02-19 NOTE — ASSESSMENT & PLAN NOTE
He has seen Neurosurgery now on aspirin 81 mg once daily he has not started it but he will do so after today's visit also on a statin I review the Neurosurgery report currently is asymptomatic if he develops any stroke-like symptoms go immediately to the ER    Avoid chiropractor

## 2020-02-19 NOTE — ASSESSMENT & PLAN NOTE
Improving he still has mild symptoms will increase Lexapro to 10 mg once daily he is tolerating the medication very well no SI he does not want counseling

## 2020-02-19 NOTE — ASSESSMENT & PLAN NOTE
Secondary to anti-inflammatories at this point time the patient has discontinue the anti inflammatory drink adequate amounts of water go for laboratories reduce sodium do not start Chantix until we verify the kidney function is stable

## 2020-03-11 ENCOUNTER — TELEPHONE (OUTPATIENT)
Dept: NEUROLOGY | Facility: CLINIC | Age: 55
End: 2020-03-11

## 2020-03-11 NOTE — TELEPHONE ENCOUNTER
[facility] to offer sooner apt with Dr Toi Kilgore tomorrow 3/12/20 @ 8am   If patient calls back please assist in [address]

## 2020-04-17 DIAGNOSIS — Z72.0 TOBACCO ABUSE: ICD-10-CM

## 2020-04-17 DIAGNOSIS — I65.02 OCCLUSION OF LEFT VERTEBRAL ARTERY: ICD-10-CM

## 2020-04-17 RX ORDER — ATORVASTATIN CALCIUM 10 MG/1
10 TABLET, FILM COATED ORAL DAILY
Qty: 30 TABLET | Refills: 1 | Status: SHIPPED | OUTPATIENT
Start: 2020-04-17 | End: 2020-07-28 | Stop reason: SDUPTHER

## 2020-04-17 RX ORDER — ASPIRIN 81 MG
TABLET, DELAYED RELEASE (ENTERIC COATED) ORAL
Qty: 120 TABLET | Refills: 0 | Status: SHIPPED | OUTPATIENT
Start: 2020-04-17 | End: 2020-07-28 | Stop reason: SDUPTHER

## 2020-04-17 RX ORDER — VARENICLINE TARTRATE
KIT
Qty: 53 TABLET | Refills: 0 | Status: SHIPPED | OUTPATIENT
Start: 2020-04-17 | End: 2020-09-02

## 2020-07-28 DIAGNOSIS — I65.02 OCCLUSION OF LEFT VERTEBRAL ARTERY: ICD-10-CM

## 2020-07-28 RX ORDER — ATORVASTATIN CALCIUM 10 MG/1
10 TABLET, FILM COATED ORAL DAILY
Qty: 30 TABLET | Refills: 0 | Status: SHIPPED | OUTPATIENT
Start: 2020-07-28 | End: 2020-08-11

## 2020-07-28 RX ORDER — ASPIRIN 81 MG/1
81 TABLET ORAL DAILY
Qty: 120 TABLET | Refills: 0 | Status: SHIPPED | OUTPATIENT
Start: 2020-07-28 | End: 2021-03-02 | Stop reason: SDUPTHER

## 2020-07-28 NOTE — TELEPHONE ENCOUNTER
Good Morning,   It looks as though this request came in to our refill bin however this patient has no scheduled follow-up in our office and I believe your ordered this last  If you could assist the patient it would be greatly appreciated  Thank you!   EMELYN Lopez

## 2020-08-11 DIAGNOSIS — I65.02 OCCLUSION OF LEFT VERTEBRAL ARTERY: ICD-10-CM

## 2020-08-11 RX ORDER — ATORVASTATIN CALCIUM 10 MG/1
10 TABLET, FILM COATED ORAL DAILY
Qty: 30 TABLET | Refills: 0 | Status: SHIPPED | OUTPATIENT
Start: 2020-08-11 | End: 2020-09-30

## 2020-09-02 ENCOUNTER — OFFICE VISIT (OUTPATIENT)
Dept: INTERNAL MEDICINE CLINIC | Facility: CLINIC | Age: 55
End: 2020-09-02
Payer: COMMERCIAL

## 2020-09-02 VITALS
WEIGHT: 178.4 LBS | SYSTOLIC BLOOD PRESSURE: 138 MMHG | BODY MASS INDEX: 24.98 KG/M2 | DIASTOLIC BLOOD PRESSURE: 88 MMHG | HEIGHT: 71 IN | OXYGEN SATURATION: 97 % | RESPIRATION RATE: 16 BRPM | HEART RATE: 72 BPM | TEMPERATURE: 98.5 F

## 2020-09-02 DIAGNOSIS — M77.01 MEDIAL EPICONDYLITIS OF ELBOW, RIGHT: Primary | ICD-10-CM

## 2020-09-02 DIAGNOSIS — F41.9 ANXIETY: ICD-10-CM

## 2020-09-02 DIAGNOSIS — Z12.5 SCREENING PSA (PROSTATE SPECIFIC ANTIGEN): ICD-10-CM

## 2020-09-02 DIAGNOSIS — N52.9 ERECTILE DYSFUNCTION, UNSPECIFIED ERECTILE DYSFUNCTION TYPE: ICD-10-CM

## 2020-09-02 DIAGNOSIS — G89.29 CHRONIC PAIN OF RIGHT KNEE: ICD-10-CM

## 2020-09-02 DIAGNOSIS — M25.50 ARTHRALGIA, UNSPECIFIED JOINT: ICD-10-CM

## 2020-09-02 DIAGNOSIS — Z13.6 SCREENING FOR CARDIOVASCULAR CONDITION: ICD-10-CM

## 2020-09-02 DIAGNOSIS — Z72.0 TOBACCO ABUSE: ICD-10-CM

## 2020-09-02 DIAGNOSIS — M25.561 CHRONIC PAIN OF RIGHT KNEE: ICD-10-CM

## 2020-09-02 DIAGNOSIS — Z13.1 SCREENING FOR DIABETES MELLITUS: ICD-10-CM

## 2020-09-02 PROCEDURE — 3725F SCREEN DEPRESSION PERFORMED: CPT | Performed by: INTERNAL MEDICINE

## 2020-09-02 PROCEDURE — 99214 OFFICE O/P EST MOD 30 MIN: CPT | Performed by: INTERNAL MEDICINE

## 2020-09-02 RX ORDER — NICOTINE 21 MG/24HR
1 PATCH, TRANSDERMAL 24 HOURS TRANSDERMAL EVERY 24 HOURS
Qty: 28 PATCH | Refills: 0 | Status: SHIPPED | OUTPATIENT
Start: 2020-09-02 | End: 2021-04-20 | Stop reason: ALTCHOICE

## 2020-09-02 RX ORDER — NICOTINE 21 MG/24HR
1 PATCH, TRANSDERMAL 24 HOURS TRANSDERMAL EVERY 24 HOURS
Qty: 14 PATCH | Refills: 0 | Status: SHIPPED | OUTPATIENT
Start: 2020-09-02 | End: 2021-04-20 | Stop reason: ALTCHOICE

## 2020-09-02 RX ORDER — LORAZEPAM 0.5 MG/1
0.5 TABLET ORAL
Qty: 30 TABLET | Refills: 0 | Status: SHIPPED | OUTPATIENT
Start: 2020-09-02 | End: 2020-09-30

## 2020-09-02 NOTE — ASSESSMENT & PLAN NOTE
He reports to me weight loss, anxiety and insomnia secondary to a recent separation from his wife he has moved out no violence he request medication for insomnia he has tried Ativan and tolerates well without any side effects I have explained the it did give nature of this medication and the potential side effects he understands and would like to continue with the Rx Rx reverse provided for Ativan 0 5 mg 1 tab at nighttime as needed p r n  Insomnia no alcohol or driving after taking medicine he is also working with a counselor    RTO in 4 weeks call if any problems

## 2020-09-02 NOTE — ASSESSMENT & PLAN NOTE
Medial joint line tenderness meniscus strain verses tear at this point time we have decided to take a consider go approach will check x-ray of the knee also start physical therapy on Rx for Voltaren gel was provided

## 2020-09-02 NOTE — PROGRESS NOTES
Assessment/Plan:    Medial epicondylitis of elbow, right  Tennis elbow strap, Rx for Voltaren gel physical therapy and x-ray of the right elbow    Tobacco abuse  I have counseled the patient to quit smoking, I have recommended that the patient set up a quit date and I have asked the patient to cut down the cigarettes until the quit date  Patient does request Rx for the nicotine replacement patch which will be provided    Screening PSA (prostate specific antigen)  Counseled, check screening PSA    Screening for diabetes mellitus  I have counselled the pt to follow a healthy and balanced diet ,and recommend routine exercise  Screening for cardiovascular condition  Check fasting lipid profile    Erectile dysfunction  Will check free and total testosterone level    Chronic pain of right knee  Medial joint line tenderness meniscus strain verses tear at this point time we have decided to take a consider go approach will check x-ray of the knee also start physical therapy on Rx for Voltaren gel was provided    Arthralgia  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 check x-ray of the left hand    Anxiety  He reports to me weight loss, anxiety and insomnia secondary to a recent separation from his wife he has moved out no violence he request medication for insomnia he has tried Ativan and tolerates well without any side effects I have explained the it did give nature of this medication and the potential side effects he understands and would like to continue with the Rx Rx reverse provided for Ativan 0 5 mg 1 tab at nighttime as needed p r n  Insomnia no alcohol or driving after taking medicine he is also working with a counselor    RTO in 4 weeks call if any problems         Problem List Items Addressed This Visit        Musculoskeletal and Integument    Medial epicondylitis of elbow, right - Primary     Tennis elbow strap, Rx for Voltaren gel physical therapy and x-ray of the right elbow         Relevant Orders Tennis elbow strap    Ambulatory referral to Physical Therapy       Other    Tobacco abuse     I have counseled the patient to quit smoking, I have recommended that the patient set up a quit date and I have asked the patient to cut down the cigarettes until the quit date  Patient does request Rx for the nicotine replacement patch which will be provided         Relevant Medications    nicotine (NICODERM CQ) 14 mg/24hr TD 24 hr patch    nicotine (NICODERM CQ) 21 mg/24 hr TD 24 hr patch    nicotine (NICODERM CQ) 7 mg/24hr TD 24 hr patch    Anxiety     He reports to me weight loss, anxiety and insomnia secondary to a recent separation from his wife he has moved out no violence he request medication for insomnia he has tried Ativan and tolerates well without any side effects I have explained the it did give nature of this medication and the potential side effects he understands and would like to continue with the Rx Rx reverse provided for Ativan 0 5 mg 1 tab at nighttime as needed p r n  Insomnia no alcohol or driving after taking medicine he is also working with a counselor    RTO in 4 weeks call if any problems         Relevant Medications    LORazepam (ATIVAN) 0 5 mg tablet    Chronic pain of right knee     Medial joint line tenderness meniscus strain verses tear at this point time we have decided to take a consider go approach will check x-ray of the knee also start physical therapy on Rx for Voltaren gel was provided         Relevant Orders    Ambulatory referral to Physical Therapy    Erectile dysfunction     Will check free and total testosterone level         Relevant Orders    Testosterone, free, total    Arthralgia     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 check x-ray of the left hand         Relevant Medications    diclofenac sodium (VOLTAREN) 1 %    Other Relevant Orders    Lyme Antibody Profile with reflex to WB    XR hand 3+ vw left    XR knee 3 vw right non injury    XR elbow 2 vw right    Vitamin D 25 hydroxy    Ambulatory referral to Physical Therapy    Screening PSA (prostate specific antigen)     Counseled, check screening PSA         Relevant Orders    PSA, Total Screen    Screening for cardiovascular condition     Check fasting lipid profile         Relevant Orders    Comprehensive metabolic panel    Lipid Panel with Direct LDL reflex    Screening for diabetes mellitus     I have counselled the pt to follow a healthy and balanced diet ,and recommend routine exercise  Relevant Orders    Hemoglobin A1C          Return to office 1  months  call if any problems  Subjective:      Patient ID: Chetan Zhao is a 60 y o  male  HPI 56-year old male coming in for a follow up visit regarding tobacco abuse, medial epicondylitis right elbow, chronic pain of the right knee, anxiety, rec tile dysfunction, thumb pain; The patient reports me compliant taking medications without untoward side effects the  The patient is here to review his medical condition, update me on the medical condition and the patient reports me no hospitalizations and no ER visits  the 1st reports me that he has pain in his base of his thumb on the left side he has had the pain for about 1 year but is increased since using an air con, social he reports a very narrow a mild chronic type of pain but intensifies with use  He has noticed pain with arm flexion of the thumb  A little bit of stiffness he has not noticed any synovitis he does not report an injury  No family history of arthritis no previous history of injury he does has tried Tylenol arthritis along with a temp cream with minimal improvement with him cream     Also reports to me medial aspect of his elbow point exquisite pain x7 months, no injury he is right-handed reports me a lot of overuse throughout the day as a   He does report me body in the day the symptoms are at their worst   With lifting he develops a sharp pain in the medial aspect of his left elbow  Tylenol arthritis and also the him cream only minimal improvement  He also he also reports me medial joint line pain of the right kne approximately 89 months approximately he does report me as a child he had a knee injury requiring surgery he reports me that the pain will awaken him at nighttime when his knee is up against the other knee some period no locking no give out does report me with flexion of the right knee get the charting her symptoms hamstrings  seperated from wife 2 months ago , no SI no HI he reports me insomnia he reports me at nighttime he is thinking about a lot of things and having difficulty falling asleep  He reports me that his stepdaughter is pregnant and he had said to her not to come back in the house  Patient will be working with a counselor he reports me he did not mean what he said and that he has a hard time controlling what he says at times  moved back  No drugs , step daughter pregnant and forbid   The following portions of the patient's history were reviewed and updated as appropriate: allergies, current medications, past family history, past medical history, past social history, past surgical history and problem list   Anxiety, insomnia smoking  No si no hi , working with counselling he does report me he is having hard time during his mind off at nighttime think about a lot of different things which leads to insomnia  Review of Systems   Constitutional: Negative for activity change, appetite change and unexpected weight change  HENT: Negative for congestion and postnasal drip  Eyes: Negative for visual disturbance  Respiratory: Negative for cough and shortness of breath  Cardiovascular: Negative for chest pain  Gastrointestinal: Negative for abdominal pain, diarrhea, nausea and vomiting  Musculoskeletal: Positive for arthralgias          Elbow/knee pain right, left base of the thumb pain   Neurological: Negative for dizziness, weakness, light-headedness, numbness and headaches  Psychiatric/Behavioral: Positive for sleep disturbance  Negative for suicidal ideas  The patient is nervous/anxious  Objective:    No follow-ups on file  No results found  No Known Allergies    Past Medical History:   Diagnosis Date    Arthritis     Cat bite     Concussion     Last Assessed: 1/23/2017    H/o Lyme disease     [facility] opioid abuse [facility] [address] [facility]     Heart murmur     Joint pain     Seizure ([facility] [address] [facility]     Last Assessed 1/23/2017    Wears glasses     "cheaters"      Past Surgical History:   Procedure Laterality Date    BACK SURGERY      lumabr herniated disc repair    BACK SURGERY      LUMBAR DISCECTOMY  03/13/2014     Right L4-L5 minimslly invasive microdiscectomy for decompression with METRx system  Operating room microscope for microdissection  Right L4-L5 epidursl steroid injection with 40 mg of Depo-Medrol      OTHER SURGICAL HISTORY  2013    lymph node removed d/t cat bite     TX COLONOSCOPY FLX DX W/COLLJ SPEC WHEN PFRMD N/A 10/30/2017    Procedure: COLONOSCOPY;  Surgeon: Charles Sal MD;  Location: [facility];   Service: Gastroenterology     Current Outpatient Medications on File Prior to Visit   Medication Sig Dispense Refill    aspirin (ASPIRIN LOW DOSE) 81 mg EC tablet Take 1 tablet (81 mg total) by mouth daily 120 tablet 0    atorvastatin (LIPITOR) 10 mg tablet TAKE 1 TABLET (10 MG TOTAL) BY MOUTH DAILY 30 tablet 0    cholecalciferol (VITAMIN D3) 1,000 units tablet Take 2,000 Units by mouth daily      Ginkgo Biloba (GINKOBA PO) Take 1 capsule by mouth      Ginseng 100 MG CAPS Take 1 capsule by mouth      Omega 3 1000 MG CAPS Take by mouth      [DISCONTINUED] escitalopram (LEXAPRO) 10 mg tablet Take 1 tablet (10 mg total) by mouth daily 30 tablet 6    [DISCONTINUED] CHANTIX STARTING MONTH ROBERTA 0 5 MG X 11 & 1 MG X 42 tablet TAKE 0 5MG DAILY FOR 3 DAYS 0 5MG TWICE A DAY FOR 4 DAYS THEN 1MG TWICE A DAY (Patient not taking: Reported on 9/2/2020) 53 tablet 0    [DISCONTINUED] nicotine (NICODERM CQ) 14 mg/24hr TD 24 hr patch Place 1 patch on the skin every 24 hours (Patient not taking: Reported on 9/2/2020) 14 patch 0    [DISCONTINUED] nicotine (NICODERM CQ) 21 mg/24 hr TD 24 hr patch Place 1 patch on the skin every 24 hours (Patient not taking: Reported on 2/10/2020) 28 patch 0    [DISCONTINUED] nicotine (NICODERM CQ) 7 mg/24hr TD 24 hr patch Place 1 patch on the skin every 24 hours (Patient not taking: Reported on 6/11/2019) 14 patch 0     No current facility-administered medications on file prior to visit        Family History   Problem Relation Age of Onset    Anxiety disorder Mother     Other Mother         Back Disorder     Depression Mother     Emphysema Mother     Heart attack Father     Other Father         Back Disorder     Diabetes Father     Hypertension Father     Diabetes Sister      Social History     Socioeconomic History    Marital status: /Civil Union     Spouse name: Not on file    Number of children: Not on file    Years of education: Not on file    Highest education level: Not on file   Occupational History    Not on file   Social Needs    Financial resource strain: Not on file    Food insecurity     Worry: Not on file     Inability: Not on file    Transportation needs     Medical: Not on file     Non-medical: Not on file   Tobacco Use    Smoking status: Current Every Day Smoker     Packs/day: 1 00     Types: Cigarettes    Smokeless tobacco: Never Used   Substance and Sexual Activity    Alcohol use: Not Currently     Alcohol/week: 56 0 standard drinks     Types: 56 Cans of beer per week     Frequency: Never     Comment: not for the past couple of years    Drug use: No     Comment: h/o drug abuse/ Per allscripts: Prescription Drug  Abuse      Sexual activity: Not on file   Lifestyle    Physical activity     Days per week: Not on file     Minutes per session: Not on file    Stress: Not on file   Relationships  Social connections     Talks on phone: Not on file     Gets together: Not on file     Attends Gnosticist service: Not on file     Active member of club or organization: Not on file     Attends meetings of clubs or organizations: Not on file     Relationship status: Not on file    Intimate partner violence     Fear of current or ex partner: Not on file     Emotionally abused: Not on file     Physically abused: Not on file     Forced sexual activity: Not on file   Other Topics Concern    Not on file   Social History Narrative    Per Allscripts:     Always uses seat belt     Daily Caffeine consumption     Employed    Full-time employment     Graduted from high school     Lives independently with spouse     Living with and caring for child      Vitals:    09/02/20 0935   BP: 138/88   Pulse: 72   Resp: 16   Temp: 98 5 °F (36 9 °C)   TempSrc: Temporal   SpO2: 97%   Weight: 80 9 kg (178 lb 6 4 oz)   Height: 5' 11" (1 803 m)     Results for orders placed or performed during the hospital encounter of 01/01/20   CBC and differential   Result Value Ref Range    WBC 8 02 4 31 - 10 16 Thousand/uL    RBC 4 71 3 88 - 5 62 Million/uL    Hemoglobin 14 8 12 0 - 17 0 g/dL    Hematocrit 44 4 36 5 - 49 3 %    MCV 94 82 - 98 fL    MCH 31 4 26 8 - 34 3 pg    MCHC 33 3 31 4 - 37 4 g/dL    RDW 12 7 11 6 - 15 1 %    MPV 9 4 8 9 - 12 7 fL    Platelets 893 166 - 716 Thousands/uL    nRBC 0 /100 WBCs    Neutrophils Relative 59 43 - 75 %    Immat GRANS % 0 0 - 2 %    Lymphocytes Relative 28 14 - 44 %    Monocytes Relative 8 4 - 12 %    Eosinophils Relative 4 0 - 6 %    Basophils Relative 1 0 - 1 %    Neutrophils Absolute 4 72 1 85 - 7 62 Thousands/µL    Immature Grans Absolute 0 02 0 00 - 0 20 Thousand/uL    Lymphocytes Absolute 2 26 0 60 - 4 47 Thousands/µL    Monocytes Absolute 0 66 0 17 - 1 22 Thousand/µL    Eosinophils Absolute 0 31 0 00 - 0 61 Thousand/µL    Basophils Absolute 0 05 0 00 - 0 10 Thousands/µL   Basic metabolic panel Result Value Ref Range    Sodium 139 136 - 145 mmol/L    Potassium 3 9 3 5 - 5 3 mmol/L    Chloride 101 100 - 108 mmol/L    CO2 29 21 - 32 mmol/L    ANION GAP 9 4 - 13 mmol/L    BUN 21 5 - 25 mg/dL    Creatinine 1 42 (H) 0 60 - 1 30 mg/dL    Glucose 97 65 - 140 mg/dL    Calcium 8 8 8 3 - 10 1 mg/dL    eGFR 55 ml/min/1 73sq m   Protime-INR   Result Value Ref Range    Protime 12 7 11 6 - 14 5 seconds    INR 0 95 0 84 - 1 19   Hepatic function panel   Result Value Ref Range    Total Bilirubin 0 30 0 20 - 1 00 mg/dL    Bilirubin, Direct 0 08 0 00 - 0 20 mg/dL    Alkaline Phosphatase 73 46 - 116 U/L    AST 18 5 - 45 U/L    ALT 34 12 - 78 U/L    Total Protein 7 3 6 4 - 8 2 g/dL    Albumin 4 0 3 5 - 5 0 g/dL   C-reactive protein   Result Value Ref Range    CRP <3 0 <3 0 mg/L   Sedimentation rate, automated   Result Value Ref Range    Sed Rate 2 0 - 10 mm/hour     Weight (last 2 days)     Date/Time   Weight    09/02/20 0935   80 9 (178 4)            Body mass index is 24 88 kg/m²  BP      Temp      Pulse     Resp      SpO2        Vitals:    09/02/20 0935   Weight: 80 9 kg (178 lb 6 4 oz)     Vitals:    09/02/20 0935   Weight: 80 9 kg (178 lb 6 4 oz)       /88   Pulse 72   Temp 98 5 °F (36 9 °C) (Temporal)   Resp 16   Ht 5' 11" (1 803 m)   Wt 80 9 kg (178 lb 6 4 oz)   SpO2 97%   BMI 24 88 kg/m²          Physical Exam  Constitutional:       General: He is not in acute distress  Appearance: He is well-developed  He is not diaphoretic  HENT:      Head: Normocephalic and atraumatic  Right Ear: External ear normal       Left Ear: External ear normal    Eyes:      General: No scleral icterus  Right eye: No discharge  Left eye: No discharge  Conjunctiva/sclera: Conjunctivae normal       Pupils: Pupils are equal, round, and reactive to light  Neck:      Musculoskeletal: Neck supple  Cardiovascular:      Rate and Rhythm: Normal rate and regular rhythm        Heart sounds: Normal heart sounds  No murmur  No friction rub  No gallop  Pulmonary:      Effort: No respiratory distress  Breath sounds: No wheezing or rales  Abdominal:      General: Bowel sounds are normal  There is no distension  Palpations: Abdomen is soft  There is no mass  Tenderness: There is no abdominal tenderness  There is no guarding or rebound  Musculoskeletal:         General: No deformity  Lymphadenopathy:      Cervical: No cervical adenopathy  Neurological:      Mental Status: He is alert  Psychiatric:         Mood and Affect: Mood is anxious  Mood is not depressed  Thought Content: Thought content does not include homicidal or suicidal ideation         medial joint line tenderness right knee, exquisite tenderness over the medial epicondyle, right elbow, left 1st MCP joint mild tenderness with flexion and extension no synovitis

## 2020-09-02 NOTE — ASSESSMENT & PLAN NOTE
I have counseled the patient to quit smoking, I have recommended that the patient set up a quit date and I have asked the patient to cut down the cigarettes until the quit date    Patient does request Rx for the nicotine replacement patch which will be provided

## 2020-09-19 ENCOUNTER — DOCUMENTATION (OUTPATIENT)
Dept: GASTROENTEROLOGY | Facility: CLINIC | Age: 55
End: 2020-09-19

## 2020-09-19 NOTE — LETTER
9/19/2020    Dear Emily Washington,    Review of our records shows you are due for the following:    Colonoscopy    Please call the following office to schedule your appointment:    [facility]    We look forward to hearing from you      Sincerely,    Dr Titus Fernandez

## 2020-09-29 DIAGNOSIS — I65.02 OCCLUSION OF LEFT VERTEBRAL ARTERY: ICD-10-CM

## 2020-09-29 DIAGNOSIS — F41.9 ANXIETY: ICD-10-CM

## 2020-09-30 RX ORDER — ATORVASTATIN CALCIUM 10 MG/1
10 TABLET, FILM COATED ORAL DAILY
Qty: 30 TABLET | Refills: 1 | Status: SHIPPED | OUTPATIENT
Start: 2020-09-30 | End: 2021-03-02 | Stop reason: SDUPTHER

## 2020-09-30 RX ORDER — LORAZEPAM 0.5 MG/1
0.5 TABLET ORAL
Qty: 30 TABLET | Refills: 0 | Status: SHIPPED | OUTPATIENT
Start: 2020-09-30 | End: 2020-11-25 | Stop reason: SDUPTHER

## 2020-11-25 DIAGNOSIS — F41.9 ANXIETY: ICD-10-CM

## 2020-11-27 RX ORDER — LORAZEPAM 0.5 MG/1
0.5 TABLET ORAL
Qty: 30 TABLET | Refills: 0 | Status: SHIPPED | OUTPATIENT
Start: 2020-11-27 | End: 2020-12-18 | Stop reason: SDUPTHER

## 2020-12-18 ENCOUNTER — OFFICE VISIT (OUTPATIENT)
Dept: INTERNAL MEDICINE CLINIC | Facility: CLINIC | Age: 55
End: 2020-12-18
Payer: COMMERCIAL

## 2020-12-18 VITALS
RESPIRATION RATE: 16 BRPM | HEART RATE: 79 BPM | SYSTOLIC BLOOD PRESSURE: 132 MMHG | DIASTOLIC BLOOD PRESSURE: 80 MMHG | HEIGHT: 71 IN | OXYGEN SATURATION: 99 % | BODY MASS INDEX: 26.12 KG/M2 | WEIGHT: 186.6 LBS

## 2020-12-18 DIAGNOSIS — N52.9 ERECTILE DYSFUNCTION, UNSPECIFIED ERECTILE DYSFUNCTION TYPE: ICD-10-CM

## 2020-12-18 DIAGNOSIS — Z23 NEED FOR VACCINATION: ICD-10-CM

## 2020-12-18 DIAGNOSIS — Z00.00 WELLNESS EXAMINATION: ICD-10-CM

## 2020-12-18 DIAGNOSIS — Z12.11 SCREENING FOR MALIGNANT NEOPLASM OF COLON: Primary | ICD-10-CM

## 2020-12-18 DIAGNOSIS — F41.9 ANXIETY: ICD-10-CM

## 2020-12-18 DIAGNOSIS — Z72.0 TOBACCO ABUSE: ICD-10-CM

## 2020-12-18 PROCEDURE — 90471 IMMUNIZATION ADMIN: CPT

## 2020-12-18 PROCEDURE — 99396 PREV VISIT EST AGE 40-64: CPT | Performed by: INTERNAL MEDICINE

## 2020-12-18 PROCEDURE — 90686 IIV4 VACC NO PRSV 0.5 ML IM: CPT

## 2020-12-18 RX ORDER — VARENICLINE TARTRATE 25 MG
KIT ORAL
Qty: 53 TABLET | Refills: 0 | Status: SHIPPED | OUTPATIENT
Start: 2020-12-18 | End: 2021-01-26

## 2020-12-18 RX ORDER — LORAZEPAM 0.5 MG/1
0.5 TABLET ORAL
Qty: 30 TABLET | Refills: 0 | Status: SHIPPED | OUTPATIENT
Start: 2020-12-18 | End: 2021-03-02 | Stop reason: SDUPTHER

## 2020-12-18 RX ORDER — TADALAFIL 20 MG/1
TABLET ORAL
Qty: 10 TABLET | Refills: 0 | Status: SHIPPED | OUTPATIENT
Start: 2020-12-18 | End: 2021-02-18 | Stop reason: SDUPTHER

## 2020-12-19 PROBLEM — Z00.00 WELLNESS EXAMINATION: Status: ACTIVE | Noted: 2020-12-19

## 2021-01-15 ENCOUNTER — LAB (OUTPATIENT)
Dept: LAB | Facility: HOSPITAL | Age: 56
End: 2021-01-15
Payer: COMMERCIAL

## 2021-01-15 DIAGNOSIS — Z13.1 SCREENING FOR DIABETES MELLITUS: ICD-10-CM

## 2021-01-15 DIAGNOSIS — M25.50 ARTHRALGIA, UNSPECIFIED JOINT: ICD-10-CM

## 2021-01-15 DIAGNOSIS — Z12.5 SCREENING PSA (PROSTATE SPECIFIC ANTIGEN): ICD-10-CM

## 2021-01-15 DIAGNOSIS — N52.9 ERECTILE DYSFUNCTION, UNSPECIFIED ERECTILE DYSFUNCTION TYPE: ICD-10-CM

## 2021-01-15 DIAGNOSIS — Z13.6 SCREENING FOR CARDIOVASCULAR CONDITION: ICD-10-CM

## 2021-01-15 LAB
25(OH)D3 SERPL-MCNC: 32.4 NG/ML (ref 30–100)
ALBUMIN SERPL BCP-MCNC: 4.1 G/DL (ref 3.5–5)
ALP SERPL-CCNC: 78 U/L (ref 46–116)
ALT SERPL W P-5'-P-CCNC: 42 U/L (ref 12–78)
ANION GAP SERPL CALCULATED.3IONS-SCNC: 5 MMOL/L (ref 4–13)
AST SERPL W P-5'-P-CCNC: 18 U/L (ref 5–45)
BILIRUB SERPL-MCNC: 0.4 MG/DL (ref 0.2–1)
BUN SERPL-MCNC: 13 MG/DL (ref 5–25)
CALCIUM SERPL-MCNC: 9.7 MG/DL (ref 8.3–10.1)
CHLORIDE SERPL-SCNC: 103 MMOL/L (ref 100–108)
CHOLEST SERPL-MCNC: 144 MG/DL (ref 50–200)
CO2 SERPL-SCNC: 33 MMOL/L (ref 21–32)
CREAT SERPL-MCNC: 0.94 MG/DL (ref 0.6–1.3)
EST. AVERAGE GLUCOSE BLD GHB EST-MCNC: 120 MG/DL
GFR SERPL CREATININE-BSD FRML MDRD: 90 ML/MIN/1.73SQ M
GLUCOSE P FAST SERPL-MCNC: 107 MG/DL (ref 65–99)
HBA1C MFR BLD: 5.8 %
HDLC SERPL-MCNC: 53 MG/DL
LDLC SERPL CALC-MCNC: 45 MG/DL (ref 0–100)
POTASSIUM SERPL-SCNC: 5.3 MMOL/L (ref 3.5–5.3)
PROT SERPL-MCNC: 7.3 G/DL (ref 6.4–8.2)
SODIUM SERPL-SCNC: 141 MMOL/L (ref 136–145)
TRIGL SERPL-MCNC: 229 MG/DL

## 2021-01-15 PROCEDURE — 80053 COMPREHEN METABOLIC PANEL: CPT

## 2021-01-15 PROCEDURE — 86618 LYME DISEASE ANTIBODY: CPT

## 2021-01-15 PROCEDURE — 36415 COLL VENOUS BLD VENIPUNCTURE: CPT

## 2021-01-15 PROCEDURE — 83036 HEMOGLOBIN GLYCOSYLATED A1C: CPT

## 2021-01-15 PROCEDURE — 84402 ASSAY OF FREE TESTOSTERONE: CPT

## 2021-01-15 PROCEDURE — 82306 VITAMIN D 25 HYDROXY: CPT

## 2021-01-15 PROCEDURE — 84403 ASSAY OF TOTAL TESTOSTERONE: CPT

## 2021-01-15 PROCEDURE — 80061 LIPID PANEL: CPT

## 2021-01-16 LAB
B BURGDOR IGG+IGM SER-ACNC: 93
TESTOST FREE SERPL-MCNC: 5.9 PG/ML (ref 7.2–24)
TESTOST SERPL-MCNC: 573 NG/DL (ref 264–916)

## 2021-01-26 DIAGNOSIS — Z72.0 TOBACCO ABUSE: ICD-10-CM

## 2021-01-26 DIAGNOSIS — Z72.0 TOBACCO ABUSE: Primary | ICD-10-CM

## 2021-01-26 RX ORDER — VARENICLINE TARTRATE
KIT
Qty: 53 TABLET | Refills: 0 | OUTPATIENT
Start: 2021-01-26

## 2021-01-26 RX ORDER — VARENICLINE TARTRATE 1 MG/1
1 TABLET, FILM COATED ORAL 2 TIMES DAILY
Qty: 60 TABLET | Refills: 2 | Status: SHIPPED | OUTPATIENT
Start: 2021-01-26 | End: 2021-02-18 | Stop reason: ALTCHOICE

## 2021-01-26 RX ORDER — VARENICLINE TARTRATE 0.5 MG/1
0.5 TABLET, FILM COATED ORAL 2 TIMES DAILY
Qty: 60 TABLET | Refills: 1 | Status: SHIPPED | OUTPATIENT
Start: 2021-01-26 | End: 2021-01-26

## 2021-01-26 NOTE — TELEPHONE ENCOUNTER
The patient needs the continuation britany  The patient will be out of the medication in 5 days needs ASAP, please  FYI When he began Chantex he was smoking 3 packs a day he is now down to 5 cigarettes a day

## 2021-02-11 ENCOUNTER — OFFICE VISIT (OUTPATIENT)
Dept: ENDOCRINOLOGY | Facility: CLINIC | Age: 56
End: 2021-02-11
Payer: COMMERCIAL

## 2021-02-11 VITALS
TEMPERATURE: 97.3 F | BODY MASS INDEX: 26.78 KG/M2 | SYSTOLIC BLOOD PRESSURE: 126 MMHG | WEIGHT: 192 LBS | HEART RATE: 72 BPM | DIASTOLIC BLOOD PRESSURE: 76 MMHG

## 2021-02-11 DIAGNOSIS — E29.1 HYPOGONADISM MALE: ICD-10-CM

## 2021-02-11 DIAGNOSIS — E78.2 MIXED HYPERLIPIDEMIA: ICD-10-CM

## 2021-02-11 DIAGNOSIS — E55.9 VITAMIN D DEFICIENCY: ICD-10-CM

## 2021-02-11 DIAGNOSIS — N52.9 ERECTILE DYSFUNCTION, UNSPECIFIED ERECTILE DYSFUNCTION TYPE: ICD-10-CM

## 2021-02-11 DIAGNOSIS — R73.03 PREDIABETES: Primary | ICD-10-CM

## 2021-02-11 PROCEDURE — 99244 OFF/OP CNSLTJ NEW/EST MOD 40: CPT | Performed by: INTERNAL MEDICINE

## 2021-02-11 RX ORDER — METFORMIN HYDROCHLORIDE 500 MG/1
500 TABLET, EXTENDED RELEASE ORAL
Qty: 30 TABLET | Refills: 4 | Status: SHIPPED | OUTPATIENT
Start: 2021-02-11 | End: 2021-03-02 | Stop reason: SDUPTHER

## 2021-02-11 RX ORDER — MELATONIN: Start: 2021-02-11 | End: 2021-03-02 | Stop reason: SDUPTHER

## 2021-02-11 RX ORDER — VARENICLINE TARTRATE 0.5 MG/1
TABLET, FILM COATED ORAL
COMMUNITY
Start: 2021-01-26 | End: 2021-02-18 | Stop reason: ALTCHOICE

## 2021-02-11 NOTE — PROGRESS NOTES
Jcarlos Mane 60 y o  male MRN: [id number]    Encounter: [id number]      Assessment/Plan     Assessment: This is a 60y o -year-old male with prediabetes   Plan:    Diagnoses and all orders for this visit:    Prediabetes  Lab Results   Component Value Date    HGBA1C 5 8 (H) 01/15/2021    most recent A1c is 5 8%, discussed the option of starting metformin 500 mg extended release with dinner for prevention of diabetes( which is off-label use) patient is agreeable to take metformin  will repeat D7E, basic metabolic profile in 3 months    Also discussed the importance of weight loss, lifestyle modifications and effect on prediabetes    -     metFORMIN (GLUCOPHAGE-XR) 500 mg 24 hr tablet; Take 1 tablet (500 mg total) by mouth daily with dinner  -     Basic metabolic panel; Future  -     Hemoglobin A1C; Future  -     T4, free; Future  -     TSH, 3rd generation; Future    Mixed hyperlipidemia   continue statins  Erectile dysfunction, unspecified erectile dysfunction type   most likely from organic cause  patient also has history of chronic opioid use in the past as well as history of smoking  will refer him to Urology   hypogonadism   patient has total testosterone which is within normal range, free testosterone is slightly low    most likely daily erectile dysfunction is not secondary to testosterone levels, as they are appropriate for his age  will repeat total testosterone, free testosterone LH FSH and prolactin before next appointment  educated about lifestyle modifications  -     Testosterone, free, total Lab Collect; Future  -     Luteinizing hormone Lab Collect; Future  -     Follicle stimulating hormone Lab Collect; Future  -     Prolactin Lab Collect; Future    Vitamin D deficiency   continue vitamin-D 3 supplementation 2000 International Units daily without missing  -     cholecalciferol (VITAMIN D3) 1,000 units tablet;  Take 5000 IU daily        CC:       History of Present Illness HPI:    Rebeca Gamboa  Is 78-year-old gentleman with medical history of prediabetes, history of chronic opioid use, history of drug abuse in the past, hyperlipidemia is here for evaluation of questionable low testosterone level  because of his symptoms of erectile dysfunction, fatigue and tiredness testosterone level was ordered by primary care physician  he has normal libido however he has erectile dysfunction and currently taking Cialis which is helping  he denies any history of prostate problems, history of prostate cancer previously    He was found to have total testosterone of 573,  Which is in normal rangefree testosterone of 5 9,  Which is slightly low low  He c/o fatigue, low energy, erectile dysfunction (managed on Cialis)   He denies H/o sleep apnea   H/o chronic use of OxyContin for many years and currently not using pain medications   He takes vitamin D 3, 2000 IU daily        he also has history of prediabetes however currently is not taking any medications     Results for Bárbara Rosado (MRN [id number]) as of 2/11/2021 15:28   Ref  Range 1/15/2021 07:58   Testosterone, Total, LC/MS Latest Ref Range: 264 - 916 ng/dL 573   TESTOSTERONE FREE Latest Ref Range: 7 2 - 24 0 pg/mL 5 9 (L)     Results for Bárbara Rosado (MRN [id number]) as of 2/11/2021 15:28   Ref   Range 1/15/2021 07:58   Sodium Latest Ref Range: 136 - 145 mmol/L 141   Potassium Latest Ref Range: 3 5 - 5 3 mmol/L 5 3   Chloride Latest Ref Range: 100 - 108 mmol/L 103   CO2 Latest Ref Range: 21 - 32 mmol/L 33 (H)   Anion Gap Latest Ref Range: 4 - 13 mmol/L 5   BUN Latest Ref Range: 5 - 25 mg/dL 13   Creatinine Latest Ref Range: 0 60 - 1 30 mg/dL 0 94   GLUCOSE FASTING Latest Ref Range: 65 - 99 mg/dL 107 (H)   Calcium Latest Ref Range: 8 3 - 10 1 mg/dL 9 7   AST Latest Ref Range: 5 - 45 U/L 18   ALT Latest Ref Range: 12 - 78 U/L 42   Alkaline Phosphatase Latest Ref Range: 46 - 116 U/L 78   Total Protein Latest Ref Range: 6 4 - 8 2 g/dL 7 3   Albumin Latest Ref Range: 3 5 - 5 0 g/dL 4 1   TOTAL BILIRUBIN Latest Ref Range: 0 20 - 1 00 mg/dL 0 40   eGFR Latest Units: ml/min/1 73sq m 90   Cholesterol Latest Ref Range: 50 - 200 mg/dL 144   Triglycerides Latest Ref Range: <=150 mg/dL 229 (H)   HDL Latest Ref Range: >=40 mg/dL 53   LDL Calculated Latest Ref Range: 0 - 100 mg/dL 45   Vit D, 25-Hydroxy Latest Ref Range: 30 0 - 100 0 ng/mL 32 4     Lab Results   Component Value Date    UEN6HARIFPMQ 0 44 12/28/2016     Results for Nava Cart (MRN [id number]) as of 2/11/2021 13:17   Ref  Range 1/15/2021 07:58   Sodium Latest Ref Range: 136 - 145 mmol/L 141   Potassium Latest Ref Range: 3 5 - 5 3 mmol/L 5 3   Chloride Latest Ref Range: 100 - 108 mmol/L 103   CO2 Latest Ref Range: 21 - 32 mmol/L 33 (H)   Anion Gap Latest Ref Range: 4 - 13 mmol/L 5   BUN Latest Ref Range: 5 - 25 mg/dL 13   Creatinine Latest Ref Range: 0 60 - 1 30 mg/dL 0 94   GLUCOSE FASTING Latest Ref Range: 65 - 99 mg/dL 107 (H)   Calcium Latest Ref Range: 8 3 - 10 1 mg/dL 9 7   AST Latest Ref Range: 5 - 45 U/L 18   ALT Latest Ref Range: 12 - 78 U/L 42   Alkaline Phosphatase Latest Ref Range: 46 - 116 U/L 78   Total Protein Latest Ref Range: 6 4 - 8 2 g/dL 7 3   Albumin Latest Ref Range: 3 5 - 5 0 g/dL 4 1   TOTAL BILIRUBIN Latest Ref Range: 0 20 - 1 00 mg/dL 0 40   eGFR Latest Units: ml/min/1 73sq m 90     Results for Nava Cart (MRN [id number]) as of 2/11/2021 13:17   Ref  Range 1/15/2021 07:58   Hemoglobin A1C Latest Ref Range: Normal 3 8-5 6%; PreDiabetic 5 7-6 4%; Diabetic >=6 5%; Glycemic control for adults with diabetes <7 0% % 5 8 (H)   eAG, EST AVG Glucose Latest Units: mg/dl 120   Testosterone, Total, LC/MS Latest Ref Range: 264 - 916 ng/dL 573   TESTOSTERONE FREE Latest Ref Range: 7 2 - 24 0 pg/mL 5 9 (L)     Results for Nava Cart (MRN [id number]) as of 2/11/2021 13:17   Ref   Range 1/15/2021 07:58   Vit D, 25-Hydroxy Latest Ref Range: 30 0 - 100 0 ng/mL 32 4 Results for Milton Nugent (MRN [id number]) as of 2/11/2021 13:12   Ref  Range 1/15/2021 07:58   Testosterone, Total, LC/MS Latest Ref Range: 264 - 916 ng/dL 573   TESTOSTERONE FREE Latest Ref Range: 7 2 - 24 0 pg/mL 5 9 (L)     Review of Systems    Historical Information   Past Medical History:   Diagnosis Date    Arthritis     Cat bite     Concussion     Last Assessed: 1/23/2017    H/o Lyme disease     H/O opioid abuse ([facility] [address] )     Heart murmur     Joint pain     Seizure ([facility] [address] )     Last Assessed 1/23/2017    Wears glasses     "cheaters"      Past Surgical History:   Procedure Laterality Date    BACK SURGERY      lumabr herniated disc repair    BACK SURGERY      LUMBAR DISCECTOMY  03/13/2014     Right L4-L5 minimslly invasive microdiscectomy for decompression with METRx system  Operating room microscope for microdissection  Right L4-L5 epidursl steroid injection with 40 mg of Depo-Medrol      OTHER SURGICAL HISTORY  2013    lymph node removed d/t cat bite     [place] COLONOSCOPY FLX DX W/COLLJ SPEC WHEN PFRMD N/A 10/30/2017    Procedure: COLONOSCOPY;  Surgeon: Yuni Lindo MD;  Location: [facility];   Service: Gastroenterology     Social History   Social History     Substance and Sexual Activity   Alcohol Use Not Currently    Alcohol/week: 56 0 standard drinks    Types: 56 Cans of beer per week    Frequency: Never    Comment: not for the past couple of years     Social History     Substance and Sexual Activity   Drug Use No    Comment: h/o drug abuse/ Per allscripts: Prescription Drug  Abuse       Social History     Tobacco Use   Smoking Status Current Every Day Smoker    Packs/day: 1 00    Types: Cigarettes   Smokeless Tobacco Never Used     Family History:   Family History   Problem Relation Age of Onset    Anxiety disorder Mother     Other Mother         Back Disorder     Depression Mother     Emphysema Mother     Heart attack Father     Other Father         Back Disorder     Diabetes Father     Hypertension Father     Diabetes Sister        Meds/Allergies   Current Outpatient Medications   Medication Sig Dispense Refill    aspirin (ASPIRIN LOW DOSE) 81 mg EC tablet Take 1 tablet (81 mg total) by mouth daily 120 tablet 0    atorvastatin (LIPITOR) 10 mg tablet TAKE 1 TABLET (10 MG TOTAL) BY MOUTH DAILY 30 tablet 1    Chantix 0 5 MG tablet       cholecalciferol (VITAMIN D3) 1,000 units tablet Take 5000 IU daily      Ginkgo Biloba (GINKOBA PO) Take 1 capsule by mouth      Ginseng 100 MG CAPS Take 1 capsule by mouth      LORazepam (ATIVAN) 0 5 mg tablet Take 1 tablet (0 5 mg total) by mouth daily at bedtime as needed for anxiety 30 tablet 0    Omega 3 1000 MG CAPS Take by mouth      tadalafil (CIALIS) 20 MG tablet One tablet by mouth every 3 days as needed for erectile dysfunction 10 tablet 0    varenicline (CHANTIX) 1 mg tablet Take 1 tablet (1 mg total) by mouth 2 (two) times a day 60 tablet 2    diclofenac sodium (VOLTAREN) 1 % Apply 2 g topically 4 (four) times a day (Patient not taking: Reported on 2/11/2021) 1 Tube 2    metFORMIN (GLUCOPHAGE-XR) 500 mg 24 hr tablet Take 1 tablet (500 mg total) by mouth daily with dinner 30 tablet 4    nicotine (NICODERM CQ) 14 mg/24hr TD 24 hr patch Place 1 patch on the skin every 24 hours (Patient not taking: Reported on 2/11/2021) 14 patch 0    nicotine (NICODERM CQ) 21 mg/24 hr TD 24 hr patch Place 1 patch on the skin every 24 hours (Patient not taking: Reported on 2/11/2021) 28 patch 0    nicotine (NICODERM CQ) 7 mg/24hr TD 24 hr patch Place 1 patch on the skin every 24 hours (Patient not taking: Reported on 2/11/2021) 14 patch 0     No current facility-administered medications for this visit  No Known Allergies    Objective   Vitals: Blood pressure 126/76, pulse 72, temperature (!) 97 3 °F (36 3 °C), weight 87 1 kg (192 lb)  Physical Exam    The history was obtained from the review of the chart, patient      Lab Results: Lab Results   Component Value Date/Time    Potassium 5 3 01/15/2021 07:58 AM    Chloride 103 01/15/2021 07:58 AM    CO2 33 (H) 01/15/2021 07:58 AM    BUN 13 01/15/2021 07:58 AM    Creatinine 0 94 01/15/2021 07:58 AM    Glucose, Fasting 107 (H) 01/15/2021 07:58 AM    Calcium 9 7 01/15/2021 07:58 AM    eGFR 90 01/15/2021 07:58 AM    Testosterone, Free 5 9 (L) 01/15/2021 07:58 AM             Imaging Studies:           I have personally reviewed pertinent reports  Portions of the record may have been created with voice recognition software  Occasional wrong word or "sound a like" substitutions may have occurred due to the inherent limitations of voice recognition software  Read the chart carefully and recognize, using context, where substitutions have occurred

## 2021-02-18 ENCOUNTER — OFFICE VISIT (OUTPATIENT)
Dept: INTERNAL MEDICINE CLINIC | Facility: CLINIC | Age: 56
End: 2021-02-18
Payer: COMMERCIAL

## 2021-02-18 VITALS
OXYGEN SATURATION: 97 % | BODY MASS INDEX: 26.94 KG/M2 | SYSTOLIC BLOOD PRESSURE: 130 MMHG | WEIGHT: 192.4 LBS | DIASTOLIC BLOOD PRESSURE: 80 MMHG | HEART RATE: 63 BPM | TEMPERATURE: 97.5 F | HEIGHT: 71 IN

## 2021-02-18 DIAGNOSIS — N52.9 ERECTILE DYSFUNCTION, UNSPECIFIED ERECTILE DYSFUNCTION TYPE: ICD-10-CM

## 2021-02-18 DIAGNOSIS — F41.9 ANXIETY: ICD-10-CM

## 2021-02-18 DIAGNOSIS — Z72.0 TOBACCO ABUSE: Primary | ICD-10-CM

## 2021-02-18 PROCEDURE — 99214 OFFICE O/P EST MOD 30 MIN: CPT | Performed by: INTERNAL MEDICINE

## 2021-02-18 PROCEDURE — 3725F SCREEN DEPRESSION PERFORMED: CPT | Performed by: INTERNAL MEDICINE

## 2021-02-18 PROCEDURE — 3008F BODY MASS INDEX DOCD: CPT | Performed by: INTERNAL MEDICINE

## 2021-02-18 RX ORDER — TADALAFIL 20 MG/1
TABLET ORAL
Qty: 10 TABLET | Refills: 0 | Status: SHIPPED | OUTPATIENT
Start: 2021-02-18

## 2021-02-18 RX ORDER — BUPROPION HYDROCHLORIDE 150 MG/1
TABLET, EXTENDED RELEASE ORAL
Qty: 57 TABLET | Refills: 0 | Status: SHIPPED | OUTPATIENT
Start: 2021-02-18 | End: 2021-03-04

## 2021-02-18 NOTE — PROGRESS NOTES
Assessment/Plan:    No problem-specific Assessment & Plan notes found for this encounter  {Assess/PlanSmartLinks:09155}      Subjective:      Patient ID: Jessica Butler is a 60 y o  male      Anxiety during the day  irritible during the day   conselor once a week   Ativan very little worse than before   Way she treats him  No hobbies and outlits    Chantix 3 pakcs 3 packs every 4 days  3 dday 10 cigarrets    No si no hi    Will schedule colonoscopy    6000 units of vitamin D        {Common ambulatory SmartLinks:08878}    Review of Systems      Objective:      /80   Pulse 63   Temp 97 5 °F (36 4 °C) (Temporal)   Ht 5' 11" (1 803 m)   Wt 87 3 kg (192 lb 6 4 oz)   SpO2 97%   BMI 26 83 kg/m²          Physical Exam

## 2021-02-18 NOTE — PATIENT INSTRUCTIONS
1  Stop taking Chantix  2  Instead of Chantix, you can start taking bupropion  Take bupropion 150 mg once a day for the first 3 days  Afterwards, take 150 mg twice a day  Quit smoking on day 8 of bupropion  3  Please schedule your colonoscopy  4  Please follow up with Endocrinology  5  Also find ways on decreasing sugar and carbohydrate intake

## 2021-02-19 NOTE — ASSESSMENT & PLAN NOTE
· Increased irritability and depressed mood secondary to life stressors  Denies SI or HI   Sees counselor once a week and takes ativan at night before bed    Plan:  · Switch from Chantix to bupropion as this could be contributing to mood change  · Encouraged continued counseling   · Encouraged outlets for stress such as physical activity

## 2021-02-19 NOTE — ASSESSMENT & PLAN NOTE
· Refill Cialis   · Patient following with Endocrinology  · Reviewed total testosterone which is WNL and free testosterone which is slightly low, likely not contributing to symptoms

## 2021-02-19 NOTE — ASSESSMENT & PLAN NOTE
· Decreasing smoking from 1 pack a day to 3 packs a day to 3 packs in 4 days  However, currently feeling very stressed due to current separation from his wife   Currently taking Chantix  · Patient has tolerated Chantix in the past but we discussed with patient how Chantix can cause depressed mood and agitation    Plan:  · Switch from Chantix to bupropion   · Instructed patient that goal was to stop smoking on Day 8 of bupropion  · Advised patient on seizure lowering threshold potential of bupropion, patient expresses understanding

## 2021-02-19 NOTE — PROGRESS NOTES
Assessment/Plan:    Tobacco abuse  · Decreasing smoking from 1 pack a day to 3 packs a day to 3 packs in 4 days  However, currently feeling very stressed due to current separation from his wife  Currently taking Chantix  · Patient has tolerated Chantix in the past but we discussed with patient how Chantix can cause depressed mood and agitation    Plan:  · Switch from Chantix to bupropion   · Instructed patient that goal was to stop smoking on Day 8 of bupropion  · Advised patient on seizure lowering threshold potential of bupropion, patient expresses understanding    Anxiety  · Increased irritability and depressed mood secondary to life stressors  Denies SI or HI  Sees counselor once a week and takes ativan at night before bed    Plan:  · Switch from Chantix to bupropion as this could be contributing to mood change  · Encouraged continued counseling   · Encouraged outlets for stress such as physical activity     Erectile dysfunction  · Refill Cialis   · Patient following with Endocrinology  · Reviewed total testosterone which is WNL and free testosterone which is slightly low, likely not contributing to symptoms       Diagnoses and all orders for this visit:    Tobacco abuse  -     buPROPion (WELLBUTRIN SR) 150 mg 12 hr tablet; Take 1 tablet (150 mg total) by mouth daily for 3 days, THEN 1 tablet (150 mg total) 2 (two) times a day for 27 days  Erectile dysfunction, unspecified erectile dysfunction type  -     tadalafil (CIALIS) 20 MG tablet; One tablet by mouth every 3 days as needed for erectile dysfunction    Anxiety          Subjective:      Patient ID: Michael Mason is a 60 y o  male  Patient reports that he has been feeling irritable with decreased mood due to his current separation process from his wife  He does feel the Ativan is mildly helping him sleep and he does see a counselor once a week    He states that the conversations and arguments he has with his wife stay in his head and he feels he is more irritable with coworkers and 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 job  He denies any HI or SI  He watches old western movies to relax and used to walk more  However, due to COVID and recent snow/cold, he has not been as physically active  He has been taking Chantix for smoking cessation  He has gone from 3 packs a day to 3 packs in 4 days  However, he has smoked more frequently these past few days due to stress in his relationship  Of note, he has been following with Endocrinology for pre-diabetes management and low testosterone  We discussed his lifestyle modifications  He is trying to cut sugary foods out of his diet  Also recommended he stop drinking premixed coffee and to cut sugar from his coffee  He had normal total testosterone and slightly low free testosterone that is most likely not contributing to erectile dysfunction  He is tolerating Cialis well  He has a follow up appointment with Endocrinology in 3 months and will get repeat lab work prior to his appointment  Endocrinology also increased his vitamin D supplementation which he has been compliant with  He has been contacted to schedule his colonoscopy  He will call back to schedule a date  The following portions of the patient's history were reviewed and updated as appropriate: allergies, current medications, past medical history, past social history and problem list     Review of Systems   Constitutional: Negative for chills and fever  HENT: Negative for sore throat  Eyes: Negative for visual disturbance  Respiratory: Negative for cough and shortness of breath  Cardiovascular: Negative for chest pain and leg swelling  Gastrointestinal: Negative for abdominal pain, nausea and vomiting  Genitourinary: Negative for difficulty urinating  Skin: Negative for rash  Neurological: Negative for dizziness  Psychiatric/Behavioral: Positive for agitation and sleep disturbance  Negative for suicidal ideas  The patient is nervous/anxious  Objective:      /80   Pulse 63   Temp 97 5 °F (36 4 °C) (Temporal)   Ht 5' 11" (1 803 m)   Wt 87 3 kg (192 lb 6 4 oz)   SpO2 97%   BMI 26 83 kg/m²          Physical Exam  Vitals signs and nursing note reviewed  Constitutional:       Appearance: Normal appearance  Neck:      Musculoskeletal: Neck supple  Vascular: No carotid bruit  Cardiovascular:      Rate and Rhythm: Normal rate and regular rhythm  Heart sounds: No murmur  Pulmonary:      Effort: Pulmonary effort is normal       Breath sounds: No wheezing, rhonchi or rales  Abdominal:      Palpations: Abdomen is soft  Tenderness: There is no abdominal tenderness  Musculoskeletal:      Right lower leg: No edema  Left lower leg: No edema  Skin:     General: Skin is warm and dry  Neurological:      General: No focal deficit present  Mental Status: He is alert and oriented to person, place, and time     Psychiatric:         Mood and Affect: Mood normal          Behavior: Behavior normal

## 2021-03-02 DIAGNOSIS — R73.03 PREDIABETES: ICD-10-CM

## 2021-03-02 DIAGNOSIS — F41.9 ANXIETY: ICD-10-CM

## 2021-03-02 DIAGNOSIS — M25.50 ARTHRALGIA, UNSPECIFIED JOINT: ICD-10-CM

## 2021-03-02 DIAGNOSIS — I65.02 OCCLUSION OF LEFT VERTEBRAL ARTERY: ICD-10-CM

## 2021-03-02 DIAGNOSIS — E55.9 VITAMIN D DEFICIENCY: ICD-10-CM

## 2021-03-02 RX ORDER — ATORVASTATIN CALCIUM 10 MG/1
10 TABLET, FILM COATED ORAL DAILY
Qty: 30 TABLET | Refills: 0 | Status: SHIPPED | OUTPATIENT
Start: 2021-03-02 | End: 2021-04-05 | Stop reason: SDUPTHER

## 2021-03-02 RX ORDER — ASPIRIN 81 MG/1
81 TABLET ORAL DAILY
Qty: 120 TABLET | Refills: 0 | Status: SHIPPED | OUTPATIENT
Start: 2021-03-02 | End: 2021-04-05 | Stop reason: SDUPTHER

## 2021-03-02 RX ORDER — MELATONIN
Qty: 90 TABLET | Refills: 0 | Status: SHIPPED | OUTPATIENT
Start: 2021-03-02 | End: 2021-04-05 | Stop reason: SDUPTHER

## 2021-03-02 RX ORDER — METFORMIN HYDROCHLORIDE 500 MG/1
500 TABLET, EXTENDED RELEASE ORAL
Qty: 30 TABLET | Refills: 0 | Status: SHIPPED | OUTPATIENT
Start: 2021-03-02 | End: 2021-04-05 | Stop reason: SDUPTHER

## 2021-03-03 DIAGNOSIS — Z72.0 TOBACCO ABUSE: ICD-10-CM

## 2021-03-03 RX ORDER — LORAZEPAM 0.5 MG/1
0.5 TABLET ORAL
Qty: 30 TABLET | Refills: 0 | Status: SHIPPED | OUTPATIENT
Start: 2021-03-03 | End: 2021-04-05 | Stop reason: SDUPTHER

## 2021-03-04 RX ORDER — BUPROPION HYDROCHLORIDE 150 MG/1
TABLET, EXTENDED RELEASE ORAL
Qty: 57 TABLET | Refills: 0 | Status: SHIPPED | OUTPATIENT
Start: 2021-03-04 | End: 2021-09-07

## 2021-04-05 DIAGNOSIS — F41.9 ANXIETY: ICD-10-CM

## 2021-04-05 DIAGNOSIS — I65.02 OCCLUSION OF LEFT VERTEBRAL ARTERY: ICD-10-CM

## 2021-04-05 DIAGNOSIS — R73.03 PREDIABETES: ICD-10-CM

## 2021-04-05 DIAGNOSIS — E55.9 VITAMIN D DEFICIENCY: ICD-10-CM

## 2021-04-05 RX ORDER — ATORVASTATIN CALCIUM 10 MG/1
10 TABLET, FILM COATED ORAL DAILY
Qty: 30 TABLET | Refills: 0 | Status: SHIPPED | OUTPATIENT
Start: 2021-04-05 | End: 2021-05-02

## 2021-04-05 RX ORDER — LORAZEPAM 0.5 MG/1
0.5 TABLET ORAL
Qty: 30 TABLET | Refills: 0 | Status: SHIPPED | OUTPATIENT
Start: 2021-04-05 | End: 2021-05-02

## 2021-04-05 RX ORDER — ASPIRIN 81 MG/1
81 TABLET ORAL DAILY
Qty: 120 TABLET | Refills: 0 | Status: SHIPPED | OUTPATIENT
Start: 2021-04-05 | End: 2021-07-06 | Stop reason: SDUPTHER

## 2021-04-05 RX ORDER — MELATONIN
Qty: 90 TABLET | Refills: 0 | Status: SHIPPED | OUTPATIENT
Start: 2021-04-05 | End: 2021-07-06 | Stop reason: SDUPTHER

## 2021-04-05 RX ORDER — METFORMIN HYDROCHLORIDE 500 MG/1
500 TABLET, EXTENDED RELEASE ORAL
Qty: 30 TABLET | Refills: 0 | Status: SHIPPED | OUTPATIENT
Start: 2021-04-05 | End: 2021-07-06 | Stop reason: SDUPTHER

## 2021-04-05 RX ORDER — LORAZEPAM 0.5 MG/1
0.5 TABLET ORAL
Qty: 30 TABLET | Refills: 0 | OUTPATIENT
Start: 2021-04-05

## 2021-04-05 NOTE — TELEPHONE ENCOUNTER
Good Afternoon Dr David Saenz,     It looks like deborah has sent us the request for this refill again  Thanks for your time!

## 2021-04-19 ENCOUNTER — IMMUNIZATIONS (OUTPATIENT)
Dept: FAMILY MEDICINE CLINIC | Facility: HOSPITAL | Age: 56
End: 2021-04-19

## 2021-04-19 DIAGNOSIS — Z23 ENCOUNTER FOR IMMUNIZATION: Primary | ICD-10-CM

## 2021-04-19 PROCEDURE — 91301 SARS-COV-2 / COVID-19 MRNA VACCINE (MODERNA) 100 MCG: CPT

## 2021-04-19 PROCEDURE — 0011A SARS-COV-2 / COVID-19 MRNA VACCINE (MODERNA) 100 MCG: CPT

## 2021-04-20 ENCOUNTER — OFFICE VISIT (OUTPATIENT)
Dept: INTERNAL MEDICINE CLINIC | Facility: CLINIC | Age: 56
End: 2021-04-20
Payer: COMMERCIAL

## 2021-04-20 VITALS
SYSTOLIC BLOOD PRESSURE: 120 MMHG | BODY MASS INDEX: 25.87 KG/M2 | RESPIRATION RATE: 16 BRPM | DIASTOLIC BLOOD PRESSURE: 72 MMHG | OXYGEN SATURATION: 99 % | HEIGHT: 71 IN | WEIGHT: 184.8 LBS | HEART RATE: 70 BPM

## 2021-04-20 DIAGNOSIS — R07.9 CHEST PAIN, UNSPECIFIED TYPE: Primary | ICD-10-CM

## 2021-04-20 DIAGNOSIS — Z72.0 TOBACCO ABUSE: ICD-10-CM

## 2021-04-20 DIAGNOSIS — F41.9 ANXIETY: ICD-10-CM

## 2021-04-20 DIAGNOSIS — R00.0 RACING HEART BEAT: ICD-10-CM

## 2021-04-20 DIAGNOSIS — Z12.11 SCREENING FOR MALIGNANT NEOPLASM OF COLON: ICD-10-CM

## 2021-04-20 DIAGNOSIS — R73.03 PREDIABETES: ICD-10-CM

## 2021-04-20 PROCEDURE — 99214 OFFICE O/P EST MOD 30 MIN: CPT | Performed by: INTERNAL MEDICINE

## 2021-04-20 PROCEDURE — 3008F BODY MASS INDEX DOCD: CPT | Performed by: INTERNAL MEDICINE

## 2021-04-20 NOTE — ASSESSMENT & PLAN NOTE
He has been having intermittent chest pain and heart racing while at work currently asymptomatic he has a family history mother and father with cardiovascular disease/ heart attack he has an elevated ASCVD risk score 7 1 he is a smoker and has hyperlipidemia at this point time I would like him to go for a nuclear stress test and also Holter monitor

## 2021-04-20 NOTE — PROGRESS NOTES
Assessment/Plan:    Chest pain   He has been having intermittent chest pain and heart racing while at work currently asymptomatic he has a family history mother and father with cardiovascular disease/ heart attack he has an elevated ASCVD risk score 7 1 he is a smoker and has hyperlipidemia at this point time I would like him to go for a nuclear stress test and also Holter monitor    Prediabetes   Pre diabetes -I have counseled the patient to follow a healthy balanced diet, I have counseled patient reduce carbohydrates and sweets in the diet, I would like the patient exercise routinely  I will be checking hemoglobin A1c and comprehensive metabolic panel  Have counseled patient about the prevention of diabetes, and the risk of progression to type 2 diabetes  Tobacco abuse    I have counseled the patient to quit smoking, I have recommended that the patient set up a quit date and I have asked the patient to cut down the cigarettes until the quit date  Currently he is not interested in quitting smoking he did not  Tolerate the Wellbutrin    Anxiety   No SI he reports me that he is now communicating with his wife and that they are going for walk today when he they are talking in getting along patient's mental health does well he reports me he is working with a counselor currently he reports me that his workplace is very stressful         Problem List Items Addressed This Visit        Other    Tobacco abuse       I have counseled the patient to quit smoking, I have recommended that the patient set up a quit date and I have asked the patient to cut down the cigarettes until the quit date   Currently he is not interested in quitting smoking he did not  Tolerate the Wellbutrin         Chest pain - Primary      He has been having intermittent chest pain and heart racing while at work currently asymptomatic he has a family history mother and father with cardiovascular disease/ heart attack he has an elevated ASCVD risk score 7 1 he is a smoker and has hyperlipidemia at this point time I would like him to go for a nuclear stress test and also Holter monitor         Relevant Orders    NM myocardial perfusion spect (stress and/or rest)    Prediabetes      Pre diabetes -I have counseled the patient to follow a healthy balanced diet, I have counseled patient reduce carbohydrates and sweets in the diet, I would like the patient exercise routinely  I will be checking hemoglobin A1c and comprehensive metabolic panel  Have counseled patient about the prevention of diabetes, and the risk of progression to type 2 diabetes  Anxiety      No SI he reports me that he is now communicating with his wife and that they are going for walk today when he they are talking in getting along patient's mental health does well he reports me he is working with a counselor currently he reports me that his workplace is very stressful           Other Visit Diagnoses     Racing heart beat        Relevant Orders    Holter monitor - 48 hour    Screening for malignant neoplasm of colon        Relevant Orders    Ambulatory referral to Gastroenterology           RTO in 4 months call if any problems  Subjective:      Patient ID: Elle Euceda is a 60 y o  male  HPI  [de-identified] -year old male coming in for a follow up visit regarding chest pain, heart racing, prediabetes, tobacco abuse and anxiety; The patient reports me compliant taking medications without untoward side effects the  The patient is here to review his medical condition, update me on the medical condition and the patient reports me no hospitalizations and no ER visits  He reports me that he is now communicating with his wife and they will be going for walk later today he feels better when they are communicating things are starting to do better    He reports me that did take the Wellbutrin it was not affected and he did not tolerate well he reports me at this point time he does not want to quit smoking  He reports me that when he is at working under lot of stress he develops chest pain and racing heart  Not exertional he has had stress echo in the past that was negative he has a strong family history of cardiovascular disease heart attack both mother and father, he is a smoker and has hyperlipidemia and elevated ASCVD risk  Currently he is asymptomatic  reports me he has noticed heart racing and chest tightness when he as at his workplace he is under constant stress at his workplace the does not occur when he is not work does not occur when he is due exertional things  Route symptoms do improve when he is able to calm himself down  The following portions of the patient's history were reviewed and updated as appropriate: allergies, current medications, past family history, past medical history, past social history, past surgical history and problem list     Review of Systems   Constitutional: Negative for activity change, appetite change and unexpected weight change  HENT: Negative for congestion and postnasal drip  Eyes: Negative for visual disturbance  Respiratory: Negative for cough and shortness of breath  Cardiovascular: Positive for chest pain  Heart racing   Gastrointestinal: Negative for abdominal pain, diarrhea, nausea and vomiting  Neurological: Negative for dizziness, light-headedness and headaches  Hematological: Negative for adenopathy  Psychiatric/Behavioral: The patient is nervous/anxious  Anxiety    Objective:    No follow-ups on file  No results found        No Known Allergies    Past Medical History:   Diagnosis Date    Arthritis     Cat bite     Concussion     Last Assessed: 1/23/2017    H/o Lyme disease     [facility] opioid [facility] [address] [facility]     Heart murmur     Joint pain     Seizure ([facility] [address] [facility]     Last Assessed 1/23/2017    Wears glasses     "cheaters"      Past Surgical History:   Procedure Laterality Date    BACK SURGERY      lumabr herniated disc repair    BACK SURGERY      LUMBAR DISCECTOMY  03/13/2014     Right L4-L5 minimslly invasive microdiscectomy for decompression with METRx system  Operating room microscope for microdissection  Right L4-L5 epidursl steroid injection with 40 mg of Depo-Medrol      OTHER SURGICAL HISTORY  2013    lymph node removed d/t cat bite     [place] COLONOSCOPY FLX DX W/COLLJ SPEC WHEN PFRMD N/A 10/30/2017    Procedure: COLONOSCOPY;  Surgeon: Manley Babinski, MD;  Location: [facility]; Service: Gastroenterology     Current Outpatient Medications on File Prior to Visit   Medication Sig Dispense Refill    aspirin (ASPIRIN LOW DOSE) 81 mg EC tablet Take 1 tablet (81 mg total) by mouth daily 120 tablet 0    atorvastatin (LIPITOR) 10 mg tablet Take 1 tablet (10 mg total) by mouth daily 30 tablet 0    cholecalciferol (VITAMIN D3) 1,000 units tablet Take 5000 IU daily 90 tablet 0    Diclofenac Sodium (VOLTAREN) 1 % Apply 2 g topically 4 (four) times a day 100 g 1    Ginkgo Biloba (GINKOBA PO) Take 1 capsule by mouth      Ginseng 100 MG CAPS Take 1 capsule by mouth      LORazepam (ATIVAN) 0 5 mg tablet Take 1 tablet (0 5 mg total) by mouth daily at bedtime as needed for anxiety 30 tablet 0    metFORMIN (GLUCOPHAGE-XR) 500 mg 24 hr tablet Take 1 tablet (500 mg total) by mouth daily with dinner 30 tablet 0    Omega 3 1000 MG CAPS Take by mouth      tadalafil (CIALIS) 20 MG tablet One tablet by mouth every 3 days as needed for erectile dysfunction 10 tablet 0    buPROPion (WELLBUTRIN SR) 150 mg 12 hr tablet TAKE 1 TABLET (150 MG TOTAL) BY MOUTH DAILY FOR 3 DAYS, THEN 1 TABLET (150 MG TOTAL) 2 (TWO) TIMES A DAY FOR 27 DAYS   57 tablet 0    diclofenac sodium (VOLTAREN) 1 % Apply 2 g topically 4 (four) times a day (Patient not taking: Reported on 2/11/2021) 1 Tube 2    [DISCONTINUED] nicotine (NICODERM CQ) 14 mg/24hr TD 24 hr patch Place 1 patch on the skin every 24 hours (Patient not taking: Reported on 2/11/2021) 14 patch 0    [DISCONTINUED] nicotine (NICODERM CQ) 21 mg/24 hr TD 24 hr patch Place 1 patch on the skin every 24 hours (Patient not taking: Reported on 2/11/2021) 28 patch 0    [DISCONTINUED] nicotine (NICODERM CQ) 7 mg/24hr TD 24 hr patch Place 1 patch on the skin every 24 hours (Patient not taking: Reported on 2/11/2021) 14 patch 0     No current facility-administered medications on file prior to visit        Family History   Problem Relation Age of Onset    Anxiety disorder Mother     Other Mother         Back Disorder     Depression Mother     Emphysema Mother     Heart attack Father     Other Father         Back Disorder     Diabetes Father     Hypertension Father     Diabetes Sister      Social History     Socioeconomic History    Marital status: /Civil Union     Spouse name: Not on file    Number of children: Not on file    Years of education: Not on file    Highest education level: Not on file   Occupational History    Not on file   Social Needs    Financial resource strain: Not on file    Food insecurity     Worry: Not on file     Inability: Not on file    Transportation needs     Medical: Not on file     Non-medical: Not on file   Tobacco Use    Smoking status: Current Every Day Smoker     Packs/day: 1 00     Types: Cigarettes    Smokeless tobacco: Never Used   Substance and Sexual Activity    Alcohol use: Not Currently     Alcohol/week: 56 0 standard drinks     Types: 56 Cans of beer per week     Frequency: Never     Comment: not for the past couple of years    Drug use: No     Comment: h/o drug abuse/ Per allscripts: Prescription Drug  Abuse      Sexual activity: Not on file   Lifestyle    Physical activity     Days per week: Not on file     Minutes per session: Not on file    Stress: Not on file   Relationships    Social connections     Talks on phone: Not on file     Gets together: Not on file     Attends [employer]ism service: Not on file     Active member of club or organization: Not on file     Attends meetings of clubs or organizations: Not on file     Relationship status: Not on file    Intimate partner violence     Fear of current or ex partner: Not on file     Emotionally abused: Not on file     Physically abused: Not on file     Forced sexual activity: Not on file   Other Topics Concern    Not on file   Social History Narrative    Per Allscripts:     Always uses seat belt     Daily Caffeine consumption     Employed    Full-time employment     Graduted from high school     Lives independently with spouse     Living with and caring for child      Vitals:    04/20/21 1454   BP: 120/72   Pulse: 70   Resp: 16   SpO2: 99%   Weight: 83 8 kg (184 lb 12 8 oz)   Height: 5' 11" (1 803 m)     Results for orders placed or performed in visit on 01/15/21   Lyme Antibody Profile with reflex to WB   Result Value Ref Range    Lyme total antibody 93 0 00 - 119   Comprehensive metabolic panel   Result Value Ref Range    Sodium 141 136 - 145 mmol/L    Potassium 5 3 3 5 - 5 3 mmol/L    Chloride 103 100 - 108 mmol/L    CO2 33 (H) 21 - 32 mmol/L    ANION GAP 5 4 - 13 mmol/L    BUN 13 5 - 25 mg/dL    Creatinine 0 94 0 60 - 1 30 mg/dL    Glucose, Fasting 107 (H) 65 - 99 mg/dL    Calcium 9 7 8 3 - 10 1 mg/dL    AST 18 5 - 45 U/L    ALT 42 12 - 78 U/L    Alkaline Phosphatase 78 46 - 116 U/L    Total Protein 7 3 6 4 - 8 2 g/dL    Albumin 4 1 3 5 - 5 0 g/dL    Total Bilirubin 0 40 0 20 - 1 00 mg/dL    eGFR 90 ml/min/1 73sq m   Hemoglobin A1C   Result Value Ref Range    Hemoglobin A1C 5 8 (H) Normal 3 8-5 6%; PreDiabetic 5 7-6 4%;  Diabetic >=6 5%; Glycemic control for adults with diabetes <7 0% %     mg/dl   Lipid Panel with Direct LDL reflex   Result Value Ref Range    Cholesterol 144 50 - 200 mg/dL    Triglycerides 229 (H) <=150 mg/dL    HDL, Direct 53 >=40 mg/dL    LDL Calculated 45 0 - 100 mg/dL   Testosterone, free, total   Result Value Ref Range    Testosterone, Free 5 9 (L) 7 2 - 24 0 pg/mL TESTOSTERONE TOTAL 573 264 - 916 ng/dL   Vitamin D 25 hydroxy   Result Value Ref Range    Vit D, 25-Hydroxy 32 4 30 0 - 100 0 ng/mL     Weight (last 2 days)     Date/Time   Weight    04/20/21 1454   83 8 (184 8)            Body mass index is 25 77 kg/m²  /72 (04/20/21 1454)    Temp      Pulse 70 (04/20/21 1454)   Resp 16 (04/20/21 1454)    SpO2 99 % (04/20/21 1454)      Vitals:    04/20/21 1454   Weight: 83 8 kg (184 lb 12 8 oz)     Vitals:    04/20/21 1454   Weight: 83 8 kg (184 lb 12 8 oz)       /72   Pulse 70   Resp 16   Ht 5' 11" (1 803 m)   Wt 83 8 kg (184 lb 12 8 oz)   SpO2 99%   BMI 25 77 kg/m²          Physical Exam  Constitutional:       General: He is not in acute distress  Appearance: He is well-developed  He is not diaphoretic  HENT:      Head: Normocephalic and atraumatic  Right Ear: External ear normal       Left Ear: External ear normal    Eyes:      General: No scleral icterus  Right eye: No discharge  Left eye: No discharge  Conjunctiva/sclera: Conjunctivae normal       Pupils: Pupils are equal, round, and reactive to light  Neck:      Musculoskeletal: Neck supple  Cardiovascular:      Rate and Rhythm: Normal rate and regular rhythm  Heart sounds: Normal heart sounds  No murmur  No friction rub  No gallop  Pulmonary:      Effort: No respiratory distress  Breath sounds: No wheezing or rales  Abdominal:      General: Bowel sounds are normal  There is no distension  Palpations: Abdomen is soft  There is no mass  Tenderness: There is no abdominal tenderness  There is no guarding or rebound  Musculoskeletal:         General: No deformity  Lymphadenopathy:      Cervical: No cervical adenopathy  Neurological:      Mental Status: He is alert  Psychiatric:         Mood and Affect: Mood is anxious  Mood is not depressed  Thought Content: Thought content does not include suicidal ideation

## 2021-04-20 NOTE — ASSESSMENT & PLAN NOTE
No SI he reports me that he is now communicating with his wife and that they are going for walk today when he they are talking in getting along patient's mental health does well he reports me he is working with a counselor currently he reports me that his workplace is very stressful

## 2021-04-20 NOTE — ASSESSMENT & PLAN NOTE
I have counseled the patient to quit smoking, I have recommended that the patient set up a quit date and I have asked the patient to cut down the cigarettes until the quit date   Currently he is not interested in quitting smoking he did not  Tolerate the Wellbutrin

## 2021-04-29 ENCOUNTER — LAB (OUTPATIENT)
Dept: LAB | Facility: HOSPITAL | Age: 56
End: 2021-04-29
Attending: INTERNAL MEDICINE
Payer: COMMERCIAL

## 2021-04-29 DIAGNOSIS — R73.03 PREDIABETES: ICD-10-CM

## 2021-04-29 DIAGNOSIS — E29.1 HYPOGONADISM MALE: ICD-10-CM

## 2021-04-29 LAB
ANION GAP SERPL CALCULATED.3IONS-SCNC: 4 MMOL/L (ref 4–13)
BUN SERPL-MCNC: 15 MG/DL (ref 5–25)
CALCIUM SERPL-MCNC: 9.1 MG/DL (ref 8.3–10.1)
CHLORIDE SERPL-SCNC: 104 MMOL/L (ref 100–108)
CO2 SERPL-SCNC: 32 MMOL/L (ref 21–32)
CREAT SERPL-MCNC: 0.9 MG/DL (ref 0.6–1.3)
EST. AVERAGE GLUCOSE BLD GHB EST-MCNC: 117 MG/DL
FSH SERPL-ACNC: 4.4 MIU/ML (ref 0.7–10.8)
GFR SERPL CREATININE-BSD FRML MDRD: 94 ML/MIN/1.73SQ M
GLUCOSE P FAST SERPL-MCNC: 125 MG/DL (ref 65–99)
HBA1C MFR BLD: 5.7 %
LH SERPL-ACNC: 3.1 MIU/ML (ref 1.2–10.6)
POTASSIUM SERPL-SCNC: 5 MMOL/L (ref 3.5–5.3)
PROLACTIN SERPL-MCNC: 5.9 NG/ML (ref 2.5–17.4)
PSA SERPL-MCNC: 0.4 NG/ML (ref 0–4)
SODIUM SERPL-SCNC: 140 MMOL/L (ref 136–145)
T4 FREE SERPL-MCNC: 0.76 NG/DL (ref 0.76–1.46)
TSH SERPL DL<=0.05 MIU/L-ACNC: 0.86 UIU/ML (ref 0.36–3.74)

## 2021-04-29 PROCEDURE — 36415 COLL VENOUS BLD VENIPUNCTURE: CPT

## 2021-04-29 PROCEDURE — 83001 ASSAY OF GONADOTROPIN (FSH): CPT

## 2021-04-29 PROCEDURE — 83002 ASSAY OF GONADOTROPIN (LH): CPT

## 2021-04-29 PROCEDURE — 80048 BASIC METABOLIC PNL TOTAL CA: CPT

## 2021-04-29 PROCEDURE — 83036 HEMOGLOBIN GLYCOSYLATED A1C: CPT

## 2021-04-29 PROCEDURE — 84443 ASSAY THYROID STIM HORMONE: CPT

## 2021-04-29 PROCEDURE — 84146 ASSAY OF PROLACTIN: CPT

## 2021-04-29 PROCEDURE — G0103 PSA SCREENING: HCPCS

## 2021-04-29 PROCEDURE — 84439 ASSAY OF FREE THYROXINE: CPT

## 2021-04-29 PROCEDURE — 84402 ASSAY OF FREE TESTOSTERONE: CPT

## 2021-04-29 PROCEDURE — 84403 ASSAY OF TOTAL TESTOSTERONE: CPT

## 2021-04-29 NOTE — RESULT ENCOUNTER NOTE
This patient has upcoming appointment, no urgent lab results, will review with patient at that time  10-Dec-2017

## 2021-04-30 DIAGNOSIS — F41.9 ANXIETY: ICD-10-CM

## 2021-04-30 DIAGNOSIS — I65.02 OCCLUSION OF LEFT VERTEBRAL ARTERY: ICD-10-CM

## 2021-04-30 LAB
TESTOST FREE SERPL-MCNC: 6.2 PG/ML (ref 7.2–24)
TESTOST SERPL-MCNC: 514 NG/DL (ref 264–916)

## 2021-05-02 RX ORDER — LORAZEPAM 0.5 MG/1
0.5 TABLET ORAL
Qty: 30 TABLET | Refills: 0 | Status: SHIPPED | OUTPATIENT
Start: 2021-05-02 | End: 2021-06-07

## 2021-05-02 RX ORDER — ATORVASTATIN CALCIUM 10 MG/1
10 TABLET, FILM COATED ORAL DAILY
Qty: 30 TABLET | Refills: 0 | Status: SHIPPED | OUTPATIENT
Start: 2021-05-02 | End: 2021-07-06 | Stop reason: SDUPTHER

## 2021-05-06 ENCOUNTER — HOSPITAL ENCOUNTER (OUTPATIENT)
Dept: NON INVASIVE DIAGNOSTICS | Facility: CLINIC | Age: 56
Discharge: HOME | End: 2021-05-06
Payer: COMMERCIAL

## 2021-05-06 DIAGNOSIS — R07.9 CHEST PAIN, UNSPECIFIED TYPE: ICD-10-CM

## 2021-05-06 DIAGNOSIS — R00.0 RACING HEART BEAT: ICD-10-CM

## 2021-05-06 LAB
MAX DIASTOLIC BP: 84 MMHG
MAX HEART RATE: 153 BPM
MAX PREDICTED HEART RATE: 163 BPM
MAX. SYSTOLIC BP: 186 MMHG
PROTOCOL NAME: NORMAL
REASON FOR TERMINATION: NORMAL
TARGET HR FORMULA: NORMAL
TEST INDICATION: NORMAL
TIME IN EXERCISE PHASE: NORMAL

## 2021-05-06 PROCEDURE — 93016 CV STRESS TEST SUPVJ ONLY: CPT | Performed by: INTERNAL MEDICINE

## 2021-05-06 PROCEDURE — 93225 XTRNL ECG REC<48 HRS REC: CPT

## 2021-05-06 PROCEDURE — G1004 CDSM NDSC: HCPCS

## 2021-05-06 PROCEDURE — 93226 XTRNL ECG REC<48 HR SCAN A/R: CPT

## 2021-05-06 PROCEDURE — 78452 HT MUSCLE IMAGE SPECT MULT: CPT | Performed by: INTERNAL MEDICINE

## 2021-05-06 PROCEDURE — 93018 CV STRESS TEST I&R ONLY: CPT | Performed by: INTERNAL MEDICINE

## 2021-05-06 PROCEDURE — 78452 HT MUSCLE IMAGE SPECT MULT: CPT

## 2021-05-06 PROCEDURE — A9502 TC99M TETROFOSMIN: HCPCS

## 2021-05-06 PROCEDURE — 93017 CV STRESS TEST TRACING ONLY: CPT

## 2021-05-07 DIAGNOSIS — R94.39 ABNORMAL STRESS TEST: Primary | ICD-10-CM

## 2021-05-10 PROCEDURE — 93227 XTRNL ECG REC<48 HR R&I: CPT | Performed by: INTERNAL MEDICINE

## 2021-05-13 ENCOUNTER — TELEPHONE (OUTPATIENT)
Dept: INTERNAL MEDICINE CLINIC | Facility: CLINIC | Age: 56
End: 2021-05-13

## 2021-05-13 NOTE — TELEPHONE ENCOUNTER
Faxed 68 pages to [phone], all recent cardiology testing, recent lab work and Dr Jenny Suazo most recent note

## 2021-05-13 NOTE — TELEPHONE ENCOUNTER
LMOM for pt to CB - he left a message on the medical records line requesting medical records be sent however did not specify what needed to be sent and to whom exactly; if a few things we can print and fax to number he provides but if it is lengthy he will have to fill out a release of health info form  Rob Porter     He provided fax number:  [phone]  Please confirm if that is correct also

## 2021-05-14 ENCOUNTER — PREP FOR PROCEDURE (OUTPATIENT)
Dept: GASTROENTEROLOGY | Facility: CLINIC | Age: 56
End: 2021-05-14

## 2021-05-14 DIAGNOSIS — Z12.11 SCREENING FOR COLON CANCER: Primary | ICD-10-CM

## 2021-05-17 ENCOUNTER — IMMUNIZATIONS (OUTPATIENT)
Dept: FAMILY MEDICINE CLINIC | Facility: HOSPITAL | Age: 56
End: 2021-05-17

## 2021-05-17 DIAGNOSIS — Z23 ENCOUNTER FOR IMMUNIZATION: Primary | ICD-10-CM

## 2021-05-17 PROCEDURE — 0012A SARS-COV-2 / COVID-19 MRNA VACCINE (MODERNA) 100 MCG: CPT

## 2021-05-17 PROCEDURE — 91301 SARS-COV-2 / COVID-19 MRNA VACCINE (MODERNA) 100 MCG: CPT

## 2021-06-07 DIAGNOSIS — F41.9 ANXIETY: ICD-10-CM

## 2021-06-07 RX ORDER — LORAZEPAM 0.5 MG/1
0.5 TABLET ORAL
Qty: 30 TABLET | Refills: 0 | Status: SHIPPED | OUTPATIENT
Start: 2021-06-07 | End: 2021-07-06 | Stop reason: SDUPTHER

## 2021-06-07 RX ORDER — LORAZEPAM 0.5 MG/1
0.5 TABLET ORAL
Qty: 30 TABLET | Refills: 0 | OUTPATIENT
Start: 2021-06-07

## 2021-07-06 DIAGNOSIS — I65.02 OCCLUSION OF LEFT VERTEBRAL ARTERY: ICD-10-CM

## 2021-07-06 DIAGNOSIS — E55.9 VITAMIN D DEFICIENCY: ICD-10-CM

## 2021-07-06 DIAGNOSIS — F41.9 ANXIETY: ICD-10-CM

## 2021-07-06 DIAGNOSIS — R73.03 PREDIABETES: ICD-10-CM

## 2021-07-06 RX ORDER — ASPIRIN 81 MG/1
81 TABLET ORAL DAILY
Qty: 120 TABLET | Refills: 0 | Status: SHIPPED | OUTPATIENT
Start: 2021-07-06 | End: 2021-12-28 | Stop reason: SDUPTHER

## 2021-07-06 RX ORDER — LORAZEPAM 0.5 MG/1
0.5 TABLET ORAL
Qty: 30 TABLET | Refills: 0 | Status: SHIPPED | OUTPATIENT
Start: 2021-07-06 | End: 2021-07-07 | Stop reason: SDUPTHER

## 2021-07-06 RX ORDER — MELATONIN
Qty: 90 TABLET | Refills: 0 | Status: SHIPPED | OUTPATIENT
Start: 2021-07-06

## 2021-07-06 RX ORDER — ATORVASTATIN CALCIUM 10 MG/1
10 TABLET, FILM COATED ORAL DAILY
Qty: 30 TABLET | Refills: 0 | Status: SHIPPED | OUTPATIENT
Start: 2021-07-06 | End: 2021-12-28 | Stop reason: SDUPTHER

## 2021-07-06 RX ORDER — METFORMIN HYDROCHLORIDE 500 MG/1
500 TABLET, EXTENDED RELEASE ORAL
Qty: 90 TABLET | Refills: 0 | Status: SHIPPED | OUTPATIENT
Start: 2021-07-06 | End: 2021-09-07

## 2021-07-06 NOTE — TELEPHONE ENCOUNTER
Good morning Dr Maribeth Tolliver! It seem we have received this patients refill request again, it looks as though you are the prescribing provider, if you could assist it would be appreciated! Have a good day!   John

## 2021-07-07 ENCOUNTER — TELEPHONE (OUTPATIENT)
Dept: GASTROENTEROLOGY | Facility: HOSPITAL | Age: 56
End: 2021-07-07

## 2021-07-07 DIAGNOSIS — F41.9 ANXIETY: ICD-10-CM

## 2021-07-07 RX ORDER — LORAZEPAM 0.5 MG/1
0.5 TABLET ORAL
Qty: 30 TABLET | Refills: 0 | Status: SHIPPED | OUTPATIENT
Start: 2021-07-07 | End: 2021-08-12

## 2021-07-08 ENCOUNTER — ANESTHESIA EVENT (OUTPATIENT)
Dept: GASTROENTEROLOGY | Facility: HOSPITAL | Age: 56
End: 2021-07-08

## 2021-07-08 ENCOUNTER — ANESTHESIA (OUTPATIENT)
Dept: GASTROENTEROLOGY | Facility: HOSPITAL | Age: 56
End: 2021-07-08

## 2021-07-08 ENCOUNTER — HOSPITAL ENCOUNTER (OUTPATIENT)
Dept: GASTROENTEROLOGY | Facility: HOSPITAL | Age: 56
Setting detail: OUTPATIENT SURGERY
Discharge: HOME | End: 2021-07-08
Attending: INTERNAL MEDICINE | Admitting: INTERNAL MEDICINE
Payer: COMMERCIAL

## 2021-07-08 VITALS
SYSTOLIC BLOOD PRESSURE: 106 MMHG | TEMPERATURE: 97.7 F | OXYGEN SATURATION: 99 % | WEIGHT: 180.12 LBS | HEART RATE: 55 BPM | RESPIRATION RATE: 18 BRPM | DIASTOLIC BLOOD PRESSURE: 74 MMHG | HEIGHT: 71 IN | BODY MASS INDEX: 25.22 KG/M2

## 2021-07-08 DIAGNOSIS — Z12.11 SCREENING FOR COLON CANCER: ICD-10-CM

## 2021-07-08 LAB — GLUCOSE SERPL-MCNC: 105 MG/DL (ref 65–140)

## 2021-07-08 PROCEDURE — 88305 TISSUE EXAM BY PATHOLOGIST: CPT | Performed by: PATHOLOGY

## 2021-07-08 PROCEDURE — 45385 COLONOSCOPY W/LESION REMOVAL: CPT | Performed by: INTERNAL MEDICINE

## 2021-07-08 PROCEDURE — 82948 REAGENT STRIP/BLOOD GLUCOSE: CPT

## 2021-07-08 RX ORDER — PROPOFOL 10 MG/ML
INJECTION, EMULSION INTRAVENOUS AS NEEDED
Status: DISCONTINUED | OUTPATIENT
Start: 2021-07-08 | End: 2021-07-08

## 2021-07-08 RX ORDER — SODIUM CHLORIDE, SODIUM LACTATE, POTASSIUM CHLORIDE, CALCIUM CHLORIDE 600; 310; 30; 20 MG/100ML; MG/100ML; MG/100ML; MG/100ML
125 INJECTION, SOLUTION INTRAVENOUS CONTINUOUS
Status: DISCONTINUED | OUTPATIENT
Start: 2021-07-08 | End: 2021-07-12 | Stop reason: HOSPADM

## 2021-07-08 RX ADMIN — PROPOFOL 20 MG: 10 INJECTION, EMULSION INTRAVENOUS at 08:12

## 2021-07-08 RX ADMIN — SODIUM CHLORIDE, SODIUM LACTATE, POTASSIUM CHLORIDE, AND CALCIUM CHLORIDE 125 ML/HR: .6; .31; .03; .02 INJECTION, SOLUTION INTRAVENOUS at 07:23

## 2021-07-08 RX ADMIN — SODIUM CHLORIDE, SODIUM LACTATE, POTASSIUM CHLORIDE, AND CALCIUM CHLORIDE: .6; .31; .03; .02 INJECTION, SOLUTION INTRAVENOUS at 08:19

## 2021-07-08 RX ADMIN — PROPOFOL 20 MG: 10 INJECTION, EMULSION INTRAVENOUS at 08:08

## 2021-07-08 RX ADMIN — PROPOFOL 20 MG: 10 INJECTION, EMULSION INTRAVENOUS at 08:16

## 2021-07-08 RX ADMIN — PROPOFOL 100 MG: 10 INJECTION, EMULSION INTRAVENOUS at 08:04

## 2021-07-08 NOTE — ANESTHESIA PREPROCEDURE EVALUATION
Procedure:  COLONOSCOPY    Relevant Problems   CARDIO   (+) Hypertriglyceridemia   (+) Occlusion of left vertebral artery      NEURO/PSYCH   (+) Anxiety      Other   (+) Erectile dysfunction   (+) Prediabetes   (+) Tobacco abuse        Physical Exam    Airway    Mallampati score: II  TM Distance: >3 FB  Neck ROM: full     Dental   Comment: Denies loose teeth,     Cardiovascular  Cardiovascular exam normal    Pulmonary  Pulmonary exam normal     Other Findings  Portions of exam deferred due to low yield and/or unknown COVID status      Anesthesia Plan  ASA Score- 3     Anesthesia Type- IV sedation with anesthesia with ASA Monitors  Additional Monitors:   Airway Plan:           Plan Factors-Exercise tolerance (METS): >4 METS  Chart reviewed  Existing labs reviewed  Patient summary reviewed  Patient is a current smoker  Induction- intravenous  Postoperative Plan-     Informed Consent- Anesthetic plan and risks discussed with patient  I personally reviewed this patient with the CRNA  Discussed and agreed on the Anesthesia Plan with the CRNA  Eris Stuart

## 2021-07-08 NOTE — H&P
History and Physical - [facility]  Arabella Solis 60 y o  male MRN: [id number]                  HPI: Arabella Solis is a 60y o  year old male who presents for colonoscopy for history of colon polyps      REVIEW OF SYSTEMS: Per the HPI, and otherwise unremarkable  Historical Information   Past Medical History:   Diagnosis Date    Arthritis     Cat bite     Colon polyp     Concussion     Last Assessed: 1/23/2017    H/o Lyme disease     [facility] [address]     Heart murmur     Joint pain     Seizure ([facility] [address] [facility]     Last Assessed 1/23/2017    Wears glasses     "cheaters"      Past Surgical History:   Procedure Laterality Date    BACK SURGERY      lumabr herniated disc repair    BACK SURGERY      LUMBAR DISCECTOMY  03/13/2014     Right L4-L5 minimslly invasive microdiscectomy for decompression with METRx system  Operating room microscope for microdissection  Right L4-L5 epidursl steroid injection with 40 mg of Depo-Medrol      OTHER SURGICAL HISTORY  2013    lymph node removed d/t cat bite     MD COLONOSCOPY FLX DX W/COLLJ SPEC WHEN PFRMD N/A 10/30/2017    Procedure: COLONOSCOPY;  Surgeon: Aric Polanco MD;  Location: [facility];   Service: Gastroenterology     Social History   Social History     Substance and Sexual Activity   Alcohol Use Not Currently    Alcohol/week: 56 0 standard drinks    Types: 56 Cans of beer per week    Comment: not for the past couple of years     Social History     Substance and Sexual Activity   Drug Use No    Comment: h/o drug abuse/ Per allscripts: Prescription Drug  Abuse       Social History     Tobacco Use   Smoking Status Current Every Day Smoker    Packs/day: 1 00    Types: Cigarettes   Smokeless Tobacco Never Used     Family History   Problem Relation Age of Onset    Anxiety disorder Mother     Other Mother         Back Disorder     Depression Mother     Emphysema Mother     Heart attack Father     Other Father         Back Disorder  Diabetes Father     Hypertension Father     Diabetes Sister        Meds/Allergies     (Not in a hospital admission)      No Known Allergies    Objective     Blood pressure 120/77, pulse 62, temperature 97 5 °F (36 4 °C), temperature source Temporal, resp  rate 22, height 5' 11" (1 803 m), weight 81 7 kg (180 lb 1 9 oz), SpO2 99 %  PHYSICAL EXAM    /77   Pulse 62   Temp 97 5 °F (36 4 °C) (Temporal)   Resp 22   Ht 5' 11" (1 803 m)   Wt 81 7 kg (180 lb 1 9 oz)   SpO2 99%   BMI 25 12 kg/m²       Gen: NAD  CV: RRR  CHEST: Clear  ABD: soft, NT/ND  EXT: no edema      ASSESSMENT/PLAN:  This is a 60y o  year old male here for colonoscopy, and he is stable and optimized for his procedure

## 2021-07-13 ENCOUNTER — TELEPHONE (OUTPATIENT)
Dept: GASTROENTEROLOGY | Facility: CLINIC | Age: 56
End: 2021-07-13

## 2021-07-13 NOTE — TELEPHONE ENCOUNTER
Called patient   Got patients voice mail  Left message for patient to please call office    Will try to call patient again later

## 2021-07-13 NOTE — TELEPHONE ENCOUNTER
----- Message from Vance Gill MD sent at 7/13/2021  7:56 AM EDT -----  Please tell him the polyp was benign in to have a colonoscopy in 5 years

## 2021-07-14 ENCOUNTER — TELEPHONE (OUTPATIENT)
Dept: GASTROENTEROLOGY | Facility: CLINIC | Age: 56
End: 2021-07-14

## 2021-07-14 NOTE — TELEPHONE ENCOUNTER
----- Message from Alicja Sewell MD sent at 7/13/2021  7:56 AM EDT -----  Please tell him the polyp was benign in to have a colonoscopy in 5 years

## 2021-07-15 ENCOUNTER — OFFICE VISIT (OUTPATIENT)
Dept: INTERNAL MEDICINE CLINIC | Facility: CLINIC | Age: 56
End: 2021-07-15
Payer: COMMERCIAL

## 2021-07-15 ENCOUNTER — TELEPHONE (OUTPATIENT)
Dept: GASTROENTEROLOGY | Facility: CLINIC | Age: 56
End: 2021-07-15

## 2021-07-15 VITALS
HEART RATE: 69 BPM | OXYGEN SATURATION: 97 % | HEIGHT: 71 IN | DIASTOLIC BLOOD PRESSURE: 70 MMHG | BODY MASS INDEX: 25.7 KG/M2 | WEIGHT: 183.6 LBS | RESPIRATION RATE: 16 BRPM | SYSTOLIC BLOOD PRESSURE: 110 MMHG

## 2021-07-15 DIAGNOSIS — L73.9 FOLLICULITIS: Primary | ICD-10-CM

## 2021-07-15 PROCEDURE — 99213 OFFICE O/P EST LOW 20 MIN: CPT | Performed by: NURSE PRACTITIONER

## 2021-07-15 RX ORDER — ROSUVASTATIN CALCIUM 20 MG/1
40 TABLET, COATED ORAL
COMMUNITY
Start: 2021-05-26 | End: 2021-12-28 | Stop reason: SDUPTHER

## 2021-07-15 RX ORDER — SULFAMETHOXAZOLE AND TRIMETHOPRIM 800; 160 MG/1; MG/1
1 TABLET ORAL EVERY 12 HOURS SCHEDULED
Qty: 14 TABLET | Refills: 0 | Status: SHIPPED | OUTPATIENT
Start: 2021-07-15 | End: 2021-07-22

## 2021-07-15 RX ORDER — METOPROLOL SUCCINATE 25 MG/1
25 TABLET, EXTENDED RELEASE ORAL
COMMUNITY
Start: 2021-05-20 | End: 2021-12-28 | Stop reason: SDUPTHER

## 2021-07-15 NOTE — TELEPHONE ENCOUNTER
----- Message from Cat Agrawal MD sent at 7/13/2021  7:56 AM EDT -----  Please tell him the polyp was benign in to have a colonoscopy in 5 years
----- Message from Nolberto Cobos MD sent at 7/13/2021  7:56 AM EDT -----  Please tell him the polyp was benign in to have a colonoscopy in 5 years
Called patient   Got no answer will try again later or tommorow
Yes

## 2021-07-15 NOTE — PROGRESS NOTES
Assessment/Plan:    Folliculitis  Start antibiotics  Avoid touching area       Diagnoses and all orders for this visit:    Folliculitis  -     sulfamethoxazole-trimethoprim (BACTRIM DS) 800-160 mg per tablet; Take 1 tablet by mouth every 12 (twelve) hours for 7 days    Other orders  -     rosuvastatin (CRESTOR) 20 MG tablet; Take 40 mg by mouth  -     metoprolol succinate (TOPROL-XL) 25 mg 24 hr tablet; Take 25 mg by mouth        BMI Counseling: Body mass index is 25 61 kg/m²  The BMI is above normal  Nutrition recommendations include 3-5 servings of fruits/vegetables daily, consuming healthier snacks and moderation in carbohydrate intake  Exercise recommendations include exercising 3-5 times per week  Subjective:      Patient ID: Zakia Cordon is a 60 y o  male  Patient co tender lump on left forehead for a few days  No pus        The following portions of the patient's history were reviewed and updated as appropriate: allergies, current medications, past family history, past medical history, past social history, past surgical history and problem list     Review of Systems   Constitutional: Negative  HENT: Negative  Eyes: Negative  Respiratory: Negative  Cardiovascular: Negative  Gastrointestinal: Negative  Musculoskeletal: Negative  Neurological: Negative  Objective:      /70   Pulse 69   Resp 16   Ht 5' 11" (1 803 m)   Wt 83 3 kg (183 lb 9 6 oz)   SpO2 97%   BMI 25 61 kg/m²          Physical Exam  Vitals and nursing note reviewed  Constitutional:       Appearance: He is well-developed  HENT:      Head: Normocephalic and atraumatic  Right Ear: External ear normal       Left Ear: External ear normal       Nose: Nose normal    Eyes:      Conjunctiva/sclera: Conjunctivae normal       Pupils: Pupils are equal, round, and reactive to light  Cardiovascular:      Rate and Rhythm: Normal rate and regular rhythm     Pulmonary:      Effort: Pulmonary effort is normal       Breath sounds: Normal breath sounds  Musculoskeletal:         General: Normal range of motion  Cervical back: Normal range of motion and neck supple  Skin:     General: Skin is warm and dry  Neurological:      Mental Status: He is alert and oriented to person, place, and time

## 2021-07-19 PROBLEM — L73.9 FOLLICULITIS: Status: ACTIVE | Noted: 2021-07-19

## 2021-07-20 ENCOUNTER — TELEPHONE (OUTPATIENT)
Dept: GASTROENTEROLOGY | Facility: CLINIC | Age: 56
End: 2021-07-20

## 2021-07-20 NOTE — TELEPHONE ENCOUNTER
----- Message from Ceci Mckeon MD sent at 7/13/2021  7:56 AM EDT -----  Please tell him the polyp was benign in to have a colonoscopy in 5 years

## 2021-08-12 DIAGNOSIS — F41.9 ANXIETY: ICD-10-CM

## 2021-08-12 RX ORDER — LORAZEPAM 0.5 MG/1
0.5 TABLET ORAL
Qty: 30 TABLET | Refills: 0 | Status: SHIPPED | OUTPATIENT
Start: 2021-08-12 | End: 2021-09-29

## 2021-08-12 NOTE — TELEPHONE ENCOUNTER
07/07/2021  1  07/07/2021  LORAZEPAM 0 5 MG TABLET  30 0  30  [facility] MAT  [id number]  SACHA (4455)  0   Comm Ins  [place]    06/07/2021  1  06/07/2021  LORAZEPAM 0 5 MG TABLET  30 0  30  [facility]  [id number]  SACHA ([id number]

## 2021-09-07 ENCOUNTER — OFFICE VISIT (OUTPATIENT)
Dept: INTERNAL MEDICINE CLINIC | Facility: CLINIC | Age: 56
End: 2021-09-07
Payer: COMMERCIAL

## 2021-09-07 VITALS
HEIGHT: 71 IN | DIASTOLIC BLOOD PRESSURE: 80 MMHG | SYSTOLIC BLOOD PRESSURE: 122 MMHG | BODY MASS INDEX: 26.94 KG/M2 | OXYGEN SATURATION: 98 % | HEART RATE: 79 BPM | WEIGHT: 192.4 LBS

## 2021-09-07 DIAGNOSIS — F41.9 ANXIETY: Primary | ICD-10-CM

## 2021-09-07 PROCEDURE — 99213 OFFICE O/P EST LOW 20 MIN: CPT | Performed by: NURSE PRACTITIONER

## 2021-09-07 PROCEDURE — 3008F BODY MASS INDEX DOCD: CPT | Performed by: NURSE PRACTITIONER

## 2021-09-07 RX ORDER — HYDROXYZINE HYDROCHLORIDE 25 MG/1
TABLET, FILM COATED ORAL
Qty: 60 TABLET | Refills: 2 | Status: SHIPPED | OUTPATIENT
Start: 2021-09-07 | End: 2021-12-28 | Stop reason: SDUPTHER

## 2021-09-07 NOTE — PROGRESS NOTES
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   Takes ativan 0 5mg daily but wants to take it twice a day  No suicidal thoughts  More anxiety than depression      The following portions of the patient's history were reviewed and updated as appropriate: allergies, current medications, past family history, past medical history, past social history, past surgical history and problem list     Review of Systems   Constitutional: Negative  HENT: Negative  Eyes: Negative  Respiratory: Negative  Cardiovascular: Negative  Gastrointestinal: Negative  Musculoskeletal: Negative  Neurological: Negative  Psychiatric/Behavioral: Positive for sleep disturbance  The patient is nervous/anxious  Objective:      /80   Pulse 79   Ht 5' 11" (1 803 m)   Wt 87 3 kg (192 lb 6 4 oz)   SpO2 98%   BMI 26 83 kg/m²          Physical Exam  Vitals and nursing note reviewed  Constitutional:       Appearance: He is well-developed  HENT:      Head: Normocephalic and atraumatic  Right Ear: External ear normal       Left Ear: External ear normal       Nose: Nose normal    Eyes:      Conjunctiva/sclera: Conjunctivae normal       Pupils: Pupils are equal, round, and reactive to light  Cardiovascular:      Rate and Rhythm: Normal rate and regular rhythm  Pulmonary:      Effort: Pulmonary effort is normal       Breath sounds: Normal breath sounds  Musculoskeletal:         General: Normal range of motion  Cervical back: Normal range of motion and neck supple  Skin:     General: Skin is warm and dry     Neurological:      Mental Status: He is alert and oriented to person, place, and time

## 2021-09-28 DIAGNOSIS — F41.9 ANXIETY: ICD-10-CM

## 2021-09-28 NOTE — TELEPHONE ENCOUNTER
Requested medication(s) are due for refill today: Yes  Patient has already received a courtesy refill: No  Other reason request has been forwarded to provider    Please check the PDMP

## 2021-09-29 RX ORDER — LORAZEPAM 0.5 MG/1
0.5 TABLET ORAL
Qty: 30 TABLET | Refills: 0 | Status: SHIPPED | OUTPATIENT
Start: 2021-09-29 | End: 2021-11-22

## 2021-11-20 DIAGNOSIS — F41.9 ANXIETY: ICD-10-CM

## 2021-11-22 RX ORDER — LORAZEPAM 0.5 MG/1
0.5 TABLET ORAL
Qty: 30 TABLET | Refills: 0 | Status: SHIPPED | OUTPATIENT
Start: 2021-11-22 | End: 2021-12-28 | Stop reason: SDUPTHER

## 2021-12-28 DIAGNOSIS — I65.02 OCCLUSION OF LEFT VERTEBRAL ARTERY: ICD-10-CM

## 2021-12-28 DIAGNOSIS — F41.9 ANXIETY: ICD-10-CM

## 2021-12-28 DIAGNOSIS — M25.50 ARTHRALGIA, UNSPECIFIED JOINT: ICD-10-CM

## 2021-12-28 RX ORDER — ATORVASTATIN CALCIUM 10 MG/1
10 TABLET, FILM COATED ORAL DAILY
Qty: 90 TABLET | Refills: 0 | Status: SHIPPED | OUTPATIENT
Start: 2021-12-28 | End: 2022-05-27

## 2021-12-28 RX ORDER — LORAZEPAM 0.5 MG/1
0.5 TABLET ORAL
Qty: 30 TABLET | Refills: 0 | Status: SHIPPED | OUTPATIENT
Start: 2021-12-28 | End: 2021-12-29 | Stop reason: SDUPTHER

## 2021-12-28 RX ORDER — ASPIRIN 81 MG/1
81 TABLET ORAL DAILY
Qty: 120 TABLET | Refills: 0 | Status: SHIPPED | OUTPATIENT
Start: 2021-12-28

## 2021-12-28 RX ORDER — ROSUVASTATIN CALCIUM 20 MG/1
40 TABLET, COATED ORAL DAILY
Qty: 90 TABLET | Refills: 1 | Status: SHIPPED | OUTPATIENT
Start: 2021-12-28

## 2021-12-28 RX ORDER — METOPROLOL SUCCINATE 25 MG/1
25 TABLET, EXTENDED RELEASE ORAL DAILY
Qty: 90 TABLET | Refills: 1 | Status: SHIPPED | OUTPATIENT
Start: 2021-12-28

## 2021-12-28 RX ORDER — HYDROXYZINE HYDROCHLORIDE 25 MG/1
TABLET, FILM COATED ORAL
Qty: 60 TABLET | Refills: 2 | Status: SHIPPED | OUTPATIENT
Start: 2021-12-28 | End: 2022-02-18 | Stop reason: SDUPTHER

## 2021-12-29 DIAGNOSIS — F41.9 ANXIETY: ICD-10-CM

## 2021-12-29 RX ORDER — LORAZEPAM 0.5 MG/1
0.5 TABLET ORAL
Qty: 30 TABLET | Refills: 0 | Status: SHIPPED | OUTPATIENT
Start: 2021-12-29 | End: 2022-02-02

## 2022-02-01 DIAGNOSIS — F41.9 ANXIETY: ICD-10-CM

## 2022-02-02 RX ORDER — LORAZEPAM 0.5 MG/1
0.5 TABLET ORAL
Qty: 30 TABLET | Refills: 0 | Status: SHIPPED | OUTPATIENT
Start: 2022-02-02 | End: 2022-03-10

## 2022-02-18 ENCOUNTER — TELEMEDICINE (OUTPATIENT)
Dept: INTERNAL MEDICINE CLINIC | Facility: CLINIC | Age: 57
End: 2022-02-18
Payer: COMMERCIAL

## 2022-02-18 VITALS — HEIGHT: 71 IN | BODY MASS INDEX: 26.18 KG/M2 | WEIGHT: 187 LBS

## 2022-02-18 DIAGNOSIS — F41.9 ANXIETY: Primary | ICD-10-CM

## 2022-02-18 DIAGNOSIS — R73.03 PREDIABETES: ICD-10-CM

## 2022-02-18 DIAGNOSIS — Z12.5 SCREENING PSA (PROSTATE SPECIFIC ANTIGEN): ICD-10-CM

## 2022-02-18 DIAGNOSIS — E78.1 HYPERTRIGLYCERIDEMIA: ICD-10-CM

## 2022-02-18 PROCEDURE — 3725F SCREEN DEPRESSION PERFORMED: CPT | Performed by: INTERNAL MEDICINE

## 2022-02-18 PROCEDURE — 99213 OFFICE O/P EST LOW 20 MIN: CPT | Performed by: INTERNAL MEDICINE

## 2022-02-18 PROCEDURE — 3008F BODY MASS INDEX DOCD: CPT | Performed by: INTERNAL MEDICINE

## 2022-02-18 RX ORDER — ESCITALOPRAM OXALATE 10 MG/1
10 TABLET ORAL DAILY
Qty: 30 TABLET | Refills: 1 | Status: SHIPPED | OUTPATIENT
Start: 2022-02-18

## 2022-02-18 RX ORDER — HYDROXYZINE HYDROCHLORIDE 25 MG/1
TABLET, FILM COATED ORAL
Qty: 60 TABLET | Refills: 2 | Status: SHIPPED | OUTPATIENT
Start: 2022-02-18

## 2022-02-18 NOTE — PROGRESS NOTES
Virtual Regular Visit    Verification of patient location:    Patient is located in the following state in which I hold an active license [place]      Assessment/Plan:    Problem List Items Addressed This Visit        Other    Hypertriglyceridemia     I have counselled the pt to follow a healthy and balanced diet ,and recommend routine exercise  Relevant Orders    Lipid Panel with Direct LDL reflex    Prediabetes     Pre diabetes -I have counseled the patient to follow a healthy balanced diet, I have counseled patient reduce carbohydrates and sweets in the diet, I would like the patient exercise routinely  I will be checking hemoglobin A1c and comprehensive metabolic panel  Have counseled patient about the prevention of diabetes, and the risk of progression to type 2 diabetes           Relevant Orders    Comprehensive metabolic panel    Hemoglobin A1C    Anxiety - Primary     Increased symptoms of anxiety related to work stress no suicidal ideation will start the patient on Lexapro 10 mg once daily reviewed the risks benefits and side effects of the medication will have patient meet with counselor Jennifer Mccullough also patient did request refill of Atarax which has been helpful p r n  today we did counseled patient we did encourage patient take some time off from work which is the source of the stress RTO in 4 weeks call if any problems         Relevant Medications    escitalopram (Lexapro) 10 mg tablet    hydrOXYzine HCL (ATARAX) 25 mg tablet    Other Relevant Orders    Ambulatory Referral to Psychiatry    Screening PSA (prostate specific antigen)    Relevant Orders    PSA, Total Screen        RTO in 4 weeks call if any problem       Reason for visit is   Chief Complaint   Patient presents with    Virtual Regular Visit     current symptoms - nausea, headache, anxiety    Virtual Regular Visit        Encounter provider Antonietta Mcdonald DO    Provider located at [facility] [place] [id number]      Recent Visits  Date Type Provider Dept   02/18/22 Telemedicine Nico Ovalle DO [facility] recent visits within past 7 days and meeting all other requirements  Future Appointments  No visits were found meeting these conditions  Showing future appointments within next 150 days and meeting all other requirements        The patient was identified by name and date of birth  Zehra Cantu was informed that this is a telemedicine visit and that the visit is being conducted through Telephone  My office door was closed  No one else was in the room  He acknowledged consent and understanding of privacy and security of the video platform  The patient has agreed to participate and understands they can discontinue the visit at any time  It was my intent to perform this visit via video technology but the patient was not able to do a video connection so the visit was completed via audio telephone only  Patient is aware this is a billable service  Subjective  Zehra Cantu is a 60 y o  male anxiety, prediabetes, hypertriglyceridemia   HPI 61-year old male coming in for a follow up visit regarding anxiety, prediabetes, hypertriglyceridemia; The patient reports me compliant taking medications without untoward side effects the  The patient is here to review his medical condition, update me on the medical condition and the patient reports me no hospitalizations and no ER visits    Patient reports me increasing work related stress related to the amount of work and demand unable to say no no SI also reports me financial stress with his daughter  Past 2 months very anxious feels like jumping out of my skin, very stress ful at work, wants to sell, amount of work no si no hi  Past Medical History:   Diagnosis Date    Arthritis     Cat bite     Colon polyp     Concussion     Last Assessed: 1/23/2017    H/o Lyme disease     H/O opioid abuse ([address] )     Heart murmur     Joint pain     Seizure [facility])     Last Assessed 1/23/2017    Wears glasses     "cheaters"        Past Surgical History:   Procedure Laterality Date    BACK SURGERY      lumabr herniated disc repair    BACK SURGERY      LUMBAR DISCECTOMY  03/13/2014     Right L4-L5 minimslly invasive microdiscectomy for decompression with METRx system  Operating room microscope for microdissection  Right L4-L5 epidursl steroid injection with 40 mg of Depo-Medrol      OTHER SURGICAL HISTORY  2013    lymph node removed d/t cat bite     CA COLONOSCOPY FLX DX W/COLLJ SPEC WHEN PFRMD N/A 10/30/2017    Procedure: COLONOSCOPY;  Surgeon: Shameka Thomas MD;  Location: [facility];   Service: Gastroenterology       Current Outpatient Medications   Medication Sig Dispense Refill    aspirin (ASPIRIN LOW DOSE) 81 mg EC tablet Take 1 tablet (81 mg total) by mouth daily 120 tablet 0    atorvastatin (LIPITOR) 10 mg tablet Take 1 tablet (10 mg total) by mouth daily 90 tablet 0    cholecalciferol (VITAMIN D3) 1,000 units tablet Take 5000 IU daily 90 tablet 0    Diclofenac Sodium (VOLTAREN) 1 % Apply 2 g topically 4 (four) times a day 100 g 1    LORazepam (ATIVAN) 0 5 mg tablet TAKE 1 TABLET (0 5 MG TOTAL) BY MOUTH DAILY AT BEDTIME AS NEEDED FOR ANXIETY 30 tablet 0    metoprolol succinate (TOPROL-XL) 25 mg 24 hr tablet Take 1 tablet (25 mg total) by mouth daily 90 tablet 1    rosuvastatin (CRESTOR) 20 MG tablet Take 2 tablets (40 mg total) by mouth daily 90 tablet 1    tadalafil (CIALIS) 20 MG tablet One tablet by mouth every 3 days as needed for erectile dysfunction 10 tablet 0    escitalopram (Lexapro) 10 mg tablet Take 1 tablet (10 mg total) by mouth daily 30 tablet 1    Ginkgo Biloba (GINKOBA PO) Take 1 capsule by mouth (Patient not taking: Reported on 9/7/2021)      Ginseng 100 MG CAPS Take 1 capsule by mouth (Patient not taking: Reported on 2/18/2022 )      hydrOXYzine HCL (ATARAX) 25 mg tablet 1-2 tablets qhs for anxiety 60 tablet 2    Omega 3 1000 MG CAPS Take by mouth (Patient not taking: Reported on 2/18/2022 )       No current facility-administered medications for this visit  No Known Allergies    Review of Systems   Psychiatric/Behavioral: Negative for suicidal ideas  The patient is nervous/anxious  Video Exam    Vitals:    02/18/22 1324   Weight: 84 8 kg (187 lb)   Height: 5' 11" (1 803 m)       Physical Exam     I spent 20 minutes with patient today in which greater than 50% of the time was spent in counseling/coordination of care regarding Anxiety, treatment of anxiety recommend  counseling    VIRTUAL VISIT DISCLAIMER      Radha Valenzuela verbally agrees to participate in [employer]  Pt is aware that [employer] could be limited without vital signs or the ability to perform a full hands-on physical exam  Jeff Alvarez understands he or the provider may request at any time to terminate the video visit and request the patient to seek care or treatment in person

## 2022-02-20 NOTE — ASSESSMENT & PLAN NOTE
Increased symptoms of anxiety related to work stress no suicidal ideation will start the patient on Lexapro 10 mg once daily reviewed the risks benefits and side effects of the medication will have patient meet with counselor Edita Astorga also patient did request refill of Atarax which has been helpful p r n  today we did counseled patient we did encourage patient take some time off from work which is the source of the stress RTO in 4 weeks call if any problems

## 2022-02-21 ENCOUNTER — TELEPHONE (OUTPATIENT)
Dept: PSYCHIATRY | Facility: CLINIC | Age: 57
End: 2022-02-21

## 2022-03-09 DIAGNOSIS — F41.9 ANXIETY: ICD-10-CM

## 2022-03-10 RX ORDER — LORAZEPAM 0.5 MG/1
0.5 TABLET ORAL
Qty: 30 TABLET | Refills: 0 | Status: SHIPPED | OUTPATIENT
Start: 2022-03-10

## 2022-05-27 DIAGNOSIS — I65.02 OCCLUSION OF LEFT VERTEBRAL ARTERY: ICD-10-CM

## 2022-05-27 RX ORDER — ATORVASTATIN CALCIUM 10 MG/1
10 TABLET, FILM COATED ORAL DAILY
Qty: 90 TABLET | Refills: 0 | Status: SHIPPED | OUTPATIENT
Start: 2022-05-27

## 2022-08-24 DIAGNOSIS — F41.9 ANXIETY: ICD-10-CM

## 2022-08-24 DIAGNOSIS — M25.50 ARTHRALGIA, UNSPECIFIED JOINT: ICD-10-CM

## 2022-08-24 DIAGNOSIS — I65.02 OCCLUSION OF LEFT VERTEBRAL ARTERY: ICD-10-CM

## 2022-08-24 RX ORDER — METOPROLOL SUCCINATE 25 MG/1
25 TABLET, EXTENDED RELEASE ORAL DAILY
Qty: 90 TABLET | Refills: 1 | Status: SHIPPED | OUTPATIENT
Start: 2022-08-24

## 2022-08-24 RX ORDER — ROSUVASTATIN CALCIUM 20 MG/1
40 TABLET, COATED ORAL DAILY
Qty: 90 TABLET | Refills: 1 | Status: SHIPPED | OUTPATIENT
Start: 2022-08-24

## 2022-08-24 RX ORDER — ATORVASTATIN CALCIUM 10 MG/1
10 TABLET, FILM COATED ORAL DAILY
Qty: 90 TABLET | Refills: 0 | Status: SHIPPED | OUTPATIENT
Start: 2022-08-24 | End: 2022-10-28

## 2022-10-29 DIAGNOSIS — F41.9 ANXIETY: ICD-10-CM

## 2022-10-29 RX ORDER — ESCITALOPRAM OXALATE 10 MG/1
10 TABLET ORAL DAILY
Qty: 30 TABLET | Refills: 1 | Status: SHIPPED | OUTPATIENT
Start: 2022-10-29

## 2022-12-01 ENCOUNTER — TELEMEDICINE (OUTPATIENT)
Dept: INTERNAL MEDICINE CLINIC | Facility: CLINIC | Age: 57
End: 2022-12-01

## 2022-12-01 VITALS — BODY MASS INDEX: 25.9 KG/M2 | HEIGHT: 71 IN | WEIGHT: 185 LBS

## 2022-12-01 DIAGNOSIS — J06.9 ACUTE UPPER RESPIRATORY INFECTION: ICD-10-CM

## 2022-12-01 DIAGNOSIS — U07.1 COVID-19: Primary | ICD-10-CM

## 2022-12-01 RX ORDER — AZITHROMYCIN 250 MG/1
TABLET, FILM COATED ORAL
Qty: 6 TABLET | Refills: 0 | Status: SHIPPED | OUTPATIENT
Start: 2022-12-01 | End: 2022-12-05

## 2022-12-01 NOTE — PROGRESS NOTES
COVID-19 Outpatient Progress Note    Assessment/Plan:    Problem List Items Addressed This Visit    None  Visit Diagnoses     COVID-19    -  Primary         Disposition:     Discussed vitamin D, vitamin C, and/or zinc supplementation with patient  Pt on day 12 of illness  First encounter for testing 2 days ago  Pt opts to forgo paxlovid  abx treatment ordered as pt at risk for PNA in setting of smoking, prolonged uri sxs  Cont mucinex, fluids  Advised CXR-pt declined  Recommend smoking cessation  Pt to call with update Monday  Advised ED for escalation of symptoms, difficulty breath, chest pain    I have spent 10 minutes directly with the patient  Greater than 50% of this time was spent in counseling/coordination of care regarding: diagnostic results, prognosis, risks and benefits of treatment options, instructions for management, patient and family education, importance of treatment compliance, risk factor reductions and impressions  Encounter provider: Larisa Jaeger [facility]     Provider located at: [address] [place] [id number]     Recent Visits  No visits were found meeting these conditions  Showing recent visits within past 7 days and meeting all other requirements  Today's Visits  Date Type Provider Dept   12/01/22 Telemedicine Bulmaro Jaeger today's visits and meeting all other requirements  Future Appointments  No visits were found meeting these conditions  Showing future appointments within next 150 days and meeting all other requirements     This virtual check-in was done via [place] and patient was informed that this is a secure, HIPAA-compliant platform  He agrees to proceed  Patient agrees to participate in a virtual check in via telephone or video visit instead of presenting to the office to address urgent/immediate medical needs  Patient is aware this is a billable service   He acknowledged consent and understanding of privacy and security of the video platform  The patient has agreed to participate and understands they can discontinue the visit at any time  After connecting through [facility], the patient was identified by name and date of birth  Michael Moody was informed that this was a telemedicine visit and that the exam was being conducted confidentially over secure lines  My office door was closed  No one else was in the room  Michael Moody acknowledged consent and understanding of privacy and security of the telemedicine visit  I informed the patient that I have reviewed his record in Epic and presented the opportunity for him to ask any questions regarding the visit today  The patient agreed to participate  Verification of patient location:  Patient is located in the following state in which I hold an active license: [place]    Subjective:   Michael Moody is a 59 y o  male who is concerned about COVID-19  Patient's symptoms include fatigue, nasal congestion, rhinorrhea, cough, chest tightness and myalgias  Patient denies fever, malaise, sore throat, shortness of breath, abdominal pain, diarrhea and headaches       - Date of symptom onset: 11/18/2022      COVID-19 vaccination status: Fully vaccinated (primary series)    Exposure:   Contact with a person who is under investigation (PUI) for or who is positive for COVID-19 within the last 14 days?: No    Hospitalized recently for fever and/or lower respiratory symptoms?: No      Currently a healthcare worker that is involved in direct patient care?: No      Works in a special setting where the risk of COVID-19 transmission may be high? (this may include long-term care, correctional and [facility] facilities; homeless shelters; assisted-living facilities and group homes ): No      Resident in a special setting where the risk of COVID-19 transmission may be high? (this may include long-term care, correctional and [facility] facilities; homeless shelters; assisted-living facilities and group homes ): No      Pt advises symptoms are gradually improving since onset 12 days ago    He tested positive for covid on 11/29    No results found for: Wang Seip, SARSCORONAVI, CORONAVIRUSR, SARSCOVAG, [facility]    Review of Systems   Constitutional: Positive for fatigue  Negative for fever  HENT: Positive for congestion and rhinorrhea  Negative for sore throat  Respiratory: Positive for cough and chest tightness  Negative for shortness of breath  Cardiovascular: Negative for chest pain  Gastrointestinal: Negative for abdominal pain and diarrhea  Musculoskeletal: Positive for myalgias  Neurological: Negative for dizziness, weakness and headaches       Current Outpatient Medications on File Prior to Visit   Medication Sig   • aspirin (ASPIRIN LOW DOSE) 81 mg EC tablet Take 1 tablet (81 mg total) by mouth daily   • atorvastatin (LIPITOR) 10 mg tablet TAKE 1 TABLET (10 MG TOTAL) BY MOUTH DAILY   • cholecalciferol (VITAMIN D3) 1,000 units tablet Take 5000 IU daily   • metoprolol succinate (TOPROL-XL) 25 mg 24 hr tablet TAKE 1 TABLET (25 MG TOTAL) BY MOUTH DAILY   • rosuvastatin (CRESTOR) 20 MG tablet TAKE 2 TABLETS (40 MG TOTAL) BY MOUTH DAILY   • Diclofenac Sodium (VOLTAREN) 1 % Apply 2 g topically 4 (four) times a day (Patient not taking: Reported on 12/1/2022)   • escitalopram (LEXAPRO) 10 mg tablet TAKE 1 TABLET (10 MG TOTAL) BY MOUTH DAILY (Patient not taking: Reported on 12/1/2022)   • Ginkgo Biloba (GINKOBA PO) Take 1 capsule by mouth (Patient not taking: Reported on 9/7/2021)   • Ginseng 100 MG CAPS Take 1 capsule by mouth (Patient not taking: Reported on 2/18/2022)   • hydrOXYzine HCL (ATARAX) 25 mg tablet 1-2 tablets qhs for anxiety (Patient not taking: Reported on 12/1/2022)   • LORazepam (ATIVAN) 0 5 mg tablet TAKE 1 TABLET (0 5 MG TOTAL) BY MOUTH DAILY AT BEDTIME AS NEEDED FOR ANXIETY (Patient not taking: Reported on 12/1/2022) • Omega 3 1000 MG CAPS Take by mouth (Patient not taking: Reported on 2/18/2022)   • tadalafil (CIALIS) 20 MG tablet One tablet by mouth every 3 days as needed for erectile dysfunction (Patient not taking: Reported on 12/1/2022)       Objective:    Ht 5' 11" (1 803 m)   Wt 83 9 kg (185 lb)   BMI 25 80 kg/m²      Physical Exam  Constitutional:       General: He is not in acute distress  Appearance: Normal appearance  He is not ill-appearing  Neurological:      Mental Status: He is alert         Dena Gipson, [address]

## 2022-12-19 DIAGNOSIS — I65.02 OCCLUSION OF LEFT VERTEBRAL ARTERY: ICD-10-CM

## 2022-12-19 RX ORDER — ATORVASTATIN CALCIUM 10 MG/1
10 TABLET, FILM COATED ORAL DAILY
Qty: 30 TABLET | Refills: 0 | Status: SHIPPED | OUTPATIENT
Start: 2022-12-19

## 2023-05-30 DIAGNOSIS — I65.02 OCCLUSION OF LEFT VERTEBRAL ARTERY: ICD-10-CM

## 2023-05-30 DIAGNOSIS — M25.50 ARTHRALGIA, UNSPECIFIED JOINT: ICD-10-CM

## 2023-05-30 DIAGNOSIS — F41.9 ANXIETY: ICD-10-CM

## 2023-05-31 RX ORDER — METOPROLOL SUCCINATE 25 MG/1
25 TABLET, EXTENDED RELEASE ORAL DAILY
Qty: 90 TABLET | Refills: 1 | Status: SHIPPED | OUTPATIENT
Start: 2023-05-31

## 2024-02-19 PROBLEM — Z13.6 SCREENING FOR CARDIOVASCULAR CONDITION: Status: RESOLVED | Noted: 2020-09-02 | Resolved: 2024-02-19

## 2024-02-19 PROBLEM — F10.939 ALCOHOL WITHDRAWAL (HCC): Status: RESOLVED | Noted: 2018-01-19 | Resolved: 2024-02-19

## 2024-02-19 PROBLEM — R22.32 AXILLARY MASS, LEFT: Status: RESOLVED | Noted: 2019-04-05 | Resolved: 2024-02-19

## 2024-02-19 PROBLEM — N17.9 AKI (ACUTE KIDNEY INJURY) (HCC): Status: RESOLVED | Noted: 2020-01-05 | Resolved: 2024-02-19

## 2024-02-19 PROBLEM — Z11.59 ENCOUNTER FOR HEPATITIS C SCREENING TEST FOR LOW RISK PATIENT: Status: RESOLVED | Noted: 2018-11-22 | Resolved: 2024-02-19

## 2024-02-19 PROBLEM — Z12.5 SCREENING PSA (PROSTATE SPECIFIC ANTIGEN): Status: RESOLVED | Noted: 2020-09-02 | Resolved: 2024-02-19

## 2024-02-19 PROBLEM — Z13.1 SCREENING FOR DIABETES MELLITUS: Status: RESOLVED | Noted: 2020-09-02 | Resolved: 2024-02-19

## 2024-02-19 PROBLEM — M25.50 ARTHRALGIA: Status: RESOLVED | Noted: 2020-09-02 | Resolved: 2024-02-19

## 2024-02-19 PROBLEM — E55.9 VITAMIN D DEFICIENCY: Status: ACTIVE | Noted: 2017-01-14

## 2024-02-19 PROBLEM — F10.939 ALCOHOL WITHDRAWAL (HCC): Status: ACTIVE | Noted: 2018-01-19

## 2024-02-19 PROBLEM — Z00.00 WELLNESS EXAMINATION: Status: RESOLVED | Noted: 2020-12-19 | Resolved: 2024-02-19

## 2024-02-19 PROBLEM — L73.9 FOLLICULITIS: Status: RESOLVED | Noted: 2021-07-19 | Resolved: 2024-02-19

## 2024-02-19 PROBLEM — F11.20 UNCOMPLICATED OPIOID DEPENDENCE (HCC): Status: RESOLVED | Noted: 2017-01-23 | Resolved: 2024-02-19

## 2024-02-19 PROBLEM — Z23 NEED FOR 23-POLYVALENT PNEUMOCOCCAL POLYSACCHARIDE VACCINE: Status: RESOLVED | Noted: 2018-11-22 | Resolved: 2024-02-19

## 2024-02-19 PROBLEM — F17.210 CIGARETTE NICOTINE DEPENDENCE WITHOUT COMPLICATION: Status: ACTIVE | Noted: 2018-11-22

## 2024-02-19 PROBLEM — R07.9 CHEST PAIN: Status: RESOLVED | Noted: 2018-11-22 | Resolved: 2024-02-19

## 2024-02-19 PROBLEM — A69.20 LYME DISEASE: Status: ACTIVE | Noted: 2017-10-17

## 2024-02-19 PROBLEM — A69.20 LYME DISEASE: Status: RESOLVED | Noted: 2017-10-17 | Resolved: 2024-02-19

## 2024-02-19 NOTE — ASSESSMENT & PLAN NOTE
Pre diabetes -I have counseled the patient to follow a healthy balanced diet, I have counseled patient reduce carbohydrates and sweets in the diet, I would like the patient exercise routinely  I will be checking hemoglobin A1c and comprehensive metabolic panel  Have counseled patient about the prevention of diabetes, and the risk of progression to type 2 diabetes  APPTS ARE READY TO BE MADE: [X] YES    Best Family or Patient Contact (if needed):    Additional Information about above appointments (if needed):    1: Follow up with PCP Dr. Orellana   2: Follow up with heme onc from [facility] Dr. Easton Macias       Other comments or requests:

## 2024-02-19 NOTE — PROGRESS NOTES
Jeff Coyle 1965 male MRN: [id number]      ASSESSMENT/PLAN  Problem List Items Addressed This Visit          Cardiovascular and Mediastinum    Occlusion of left vertebral artery    Relevant Orders    Comprehensive metabolic panel    Lipid panel       Other    Vitamin D deficiency    Relevant Orders    Vitamin D 25 hydroxy    Prediabetes    Relevant Orders    Hemoglobin A1C    Hypertriglyceridemia    Relevant Orders    Comprehensive metabolic panel    Lipid panel    Cigarette nicotine dependence without complication    Relevant Orders    CBC and differential    CT lung screening program     Other Visit Diagnoses       Bilateral thumb pain    -  Primary    Relevant Orders    XR wrist 3+ vw left    XR wrist 3+ vw right    XR thumb left first digit-min 2v    XR thumb right first digit-min 2v    Ambulatory referral to Hand Surgery    Bilateral wrist pain        Relevant Orders    XR wrist 3+ vw left    XR wrist 3+ vw right    XR thumb left first digit-min 2v    XR thumb right first digit-min 2v    Ambulatory referral to Hand Surgery    Healthcare maintenance        Screening for prostate cancer        Relevant Orders    PSA, Total Screen    Encounter for immunization        Relevant Orders    influenza vaccine, quadrivalent, 0.5 mL, preservative-free, for adult and pediatric patients 6 mos+ (AFLURIA, FLUARIX, FLULAVAL, FLUZONE)    Pneumococcal Conjugate Vaccine 20-valent (Pcv20)          Hand symptoms most likely OA -- will XR wrist/thumb to confirm and refer to Ortho Hand for injections     Health Maintenance:   BP WNL   Update labs as above   CRC UTD  PSA DUE -- ordered   Lung CA DUE -- reviewed low dose CT, f/u pending results -- pt agreeable   Vaccinations: Pneumo given, TDap UTD, Shingles going to schedule nurse visit, Flu given, COVID completed primary defers booster   Encouraged regular physical activity, varied diet, and regular dental/eye exams         No future appointments.         SUBJECTIVE  CC:  "Establish [facility] and Thumb Pain (Bilateral thumb pain, patient does work with his hands, the pain is getting much worse.)      HPI:  Jeff Coyle is a 58 y.o. male who presents to [facility]. History reviewed and updated as below.     B/L thumb pain -- has been present for years, but has progressed to the point where he can barely turn things (L>R). Does get warm/swollen, hurts to bump it.     Review of Systems   Constitutional:  Negative for unexpected weight change.   HENT:  Positive for congestion and sneezing. Negative for ear pain, rhinorrhea and sore throat.    Eyes:  Negative for visual disturbance (wears glasses).   Respiratory:  Positive for shortness of breath (with exertion). Negative for cough.    Cardiovascular:  Negative for chest pain, palpitations and leg swelling.   Gastrointestinal:  Negative for abdominal pain, blood in stool, constipation and diarrhea.   Endocrine: Negative for polyuria.   Genitourinary:  Positive for difficulty urinating (\"takes a little longer\"). Negative for dysuria and hematuria.   Musculoskeletal:  Positive for arthralgias.   Neurological:  Negative for dizziness and headaches.   Psychiatric/Behavioral:  Negative for sleep disturbance.        Historical Information   The patient history was reviewed and updated as follows:    Past Medical History:   Diagnosis Date    GREGOR (acute kidney injury) (HCC)     Alcohol withdrawal (HCC)     Arthritis     Cat bite     Closed fracture of triquetral bone of wrist     Colon polyp     Concussion     Last Assessed: 1/23/2017    COVID-19 11/29/2022    Folliculitis     H/o Lyme disease     H/O opioid abuse (HCC)     Heart murmur     Joint pain     Lyme disease     Seizure (HCC)     Last Assessed 1/23/2017    Uncomplicated opioid dependence (HCC)     Wears glasses     \"cheaters\"      Past Surgical History:   Procedure Laterality Date    BACK SURGERY      lumabr herniated disc repair    LUMBAR DISCECTOMY  03/13/2014    DR Ambrose L4-L5 " minimslly invasive microdiscectomy for decompression with METRx system. Operating room microscope for microdissection. Right L4-L5 epidursl steroid injection with 40 mg of Depo-Medrol      OTHER SURGICAL HISTORY  2013    lymph node removed d/t cat bite     [place] COLONOSCOPY FLX DX W/COLLJ SPEC WHEN PFRMD N/A 10/30/2017    Procedure: COLONOSCOPY;  Surgeon: Candelario Tobias III, MD;  Location: [facility];  Service: Gastroenterology     Family History   Problem Relation Age of Onset    Anxiety disorder Mother     Other Mother         Back Disorder     Depression Mother     Emphysema Mother     Heart attack Father     Other Father         Back Disorder     Diabetes Father     Hypertension Father     Diabetes Sister     Diabetes Sister       Social History   Social History     Substance and Sexual Activity   Alcohol Use Not Currently    Comment: 3 beers per day     Social History     Substance and Sexual Activity   Drug Use No     Social History     Tobacco Use   Smoking Status Every Day    Current packs/day: 2.00    Average packs/day: 2.0 packs/day for 17.0 years (34.0 ttl pk-yrs)    Types: Cigarettes   Smokeless Tobacco Never       Medications:     Current Outpatient Medications:     aspirin (ASPIRIN LOW DOSE) 81 mg EC tablet, Take 1 tablet (81 mg total) by mouth daily, Disp: 120 tablet, Rfl: 0    Biotin 1000 MCG CHEW, Chew 1 tablet daily, Disp: , Rfl:     cholecalciferol (VITAMIN D3) 1,000 units tablet, Take 5000 IU daily, Disp: 90 tablet, Rfl: 0    Cranberry-Vitamin C-Vitamin E 4200-20-3 MG-MG-UNIT CAPS, Take 1 capsule by mouth in the morning, Disp: , Rfl:     Garlic 1000 MG CAPS, Take 1 capsule by mouth in the morning, Disp: , Rfl:     Turmeric 500 MG TABS, Take 1 tablet by mouth in the morning, Disp: , Rfl:     Zinc 50 MG TABS, Take 1 tablet by mouth daily, Disp: , Rfl:   No Known Allergies    OBJECTIVE    Vitals:   Vitals:    02/20/24 0905   BP: 118/78   Pulse: 87   Temp: 98.6 °F (37 °C)   SpO2: 98%   Weight: 92.1 kg  "(203 lb)   Height: 5' 11\" (1.803 m)           Physical Exam  Vitals and nursing note reviewed.   Constitutional:       General: He is not in acute distress.     Appearance: Normal appearance.   HENT:      Head: Normocephalic and atraumatic.      Right Ear: Ear canal and external ear normal. There is impacted cerumen.      Left Ear: Ear canal and external ear normal. There is impacted cerumen.      Nose: Nose normal.      Mouth/Throat:      Mouth: Mucous membranes are moist.      Pharynx: No oropharyngeal exudate or posterior oropharyngeal erythema.   Eyes:      Conjunctiva/sclera: Conjunctivae normal.   Cardiovascular:      Rate and Rhythm: Normal rate and regular rhythm.   Pulmonary:      Effort: Pulmonary effort is normal. No respiratory distress.      Breath sounds: Normal breath sounds.   Abdominal:      General: Bowel sounds are normal. There is no distension.      Palpations: Abdomen is soft.      Tenderness: There is no abdominal tenderness.   Musculoskeletal:        Arms:       Right lower leg: No edema.      Left lower leg: No edema.      Comments: Area of concern L>R    Lymphadenopathy:      Cervical: No cervical adenopathy.   Skin:     General: Skin is warm and dry.   Neurological:      Mental Status: He is alert.      Comments: Grossly intact   Psychiatric:         Mood and Affect: Mood normal.                    Rebeka Vazquez DO  [facility]   2/20/2024  9:37 AM    "

## 2024-02-20 ENCOUNTER — OFFICE VISIT (OUTPATIENT)
Dept: FAMILY MEDICINE CLINIC | Facility: CLINIC | Age: 59
End: 2024-02-20
Payer: COMMERCIAL

## 2024-02-20 VITALS
HEIGHT: 71 IN | WEIGHT: 203 LBS | HEART RATE: 87 BPM | OXYGEN SATURATION: 98 % | SYSTOLIC BLOOD PRESSURE: 118 MMHG | TEMPERATURE: 98.6 F | BODY MASS INDEX: 28.42 KG/M2 | DIASTOLIC BLOOD PRESSURE: 78 MMHG

## 2024-02-20 DIAGNOSIS — Z12.5 SCREENING FOR PROSTATE CANCER: ICD-10-CM

## 2024-02-20 DIAGNOSIS — E55.9 VITAMIN D DEFICIENCY: ICD-10-CM

## 2024-02-20 DIAGNOSIS — M79.645 BILATERAL THUMB PAIN: Primary | ICD-10-CM

## 2024-02-20 DIAGNOSIS — R73.03 PREDIABETES: ICD-10-CM

## 2024-02-20 DIAGNOSIS — F17.210 CIGARETTE NICOTINE DEPENDENCE WITHOUT COMPLICATION: ICD-10-CM

## 2024-02-20 DIAGNOSIS — M25.531 BILATERAL WRIST PAIN: ICD-10-CM

## 2024-02-20 DIAGNOSIS — M79.644 BILATERAL THUMB PAIN: Primary | ICD-10-CM

## 2024-02-20 DIAGNOSIS — Z23 ENCOUNTER FOR IMMUNIZATION: ICD-10-CM

## 2024-02-20 DIAGNOSIS — M25.532 BILATERAL WRIST PAIN: ICD-10-CM

## 2024-02-20 DIAGNOSIS — I65.02 OCCLUSION OF LEFT VERTEBRAL ARTERY: ICD-10-CM

## 2024-02-20 DIAGNOSIS — Z00.00 HEALTHCARE MAINTENANCE: ICD-10-CM

## 2024-02-20 DIAGNOSIS — E78.1 HYPERTRIGLYCERIDEMIA: ICD-10-CM

## 2024-02-20 PROCEDURE — 90677 PCV20 VACCINE IM: CPT | Performed by: FAMILY MEDICINE

## 2024-02-20 PROCEDURE — 99214 OFFICE O/P EST MOD 30 MIN: CPT | Performed by: FAMILY MEDICINE

## 2024-02-20 PROCEDURE — 90686 IIV4 VACC NO PRSV 0.5 ML IM: CPT | Performed by: FAMILY MEDICINE

## 2024-02-20 PROCEDURE — 90472 IMMUNIZATION ADMIN EACH ADD: CPT | Performed by: FAMILY MEDICINE

## 2024-02-20 PROCEDURE — 90471 IMMUNIZATION ADMIN: CPT | Performed by: FAMILY MEDICINE

## 2024-02-20 PROCEDURE — 99396 PREV VISIT EST AGE 40-64: CPT | Performed by: FAMILY MEDICINE

## 2024-02-20 RX ORDER — GINSENG 100 MG
1 CAPSULE ORAL DAILY
COMMUNITY

## 2024-02-20 RX ORDER — TURMERIC ROOT EXTRACT 500 MG
1 TABLET ORAL DAILY
COMMUNITY

## 2024-02-20 RX ORDER — BIOTIN 1000 MCG
1 TABLET,CHEWABLE ORAL DAILY
COMMUNITY

## 2024-03-07 ENCOUNTER — APPOINTMENT (OUTPATIENT)
Age: 59
End: 2024-03-07
Payer: COMMERCIAL

## 2024-03-07 DIAGNOSIS — M25.531 BILATERAL WRIST PAIN: ICD-10-CM

## 2024-03-07 DIAGNOSIS — R73.03 PREDIABETES: ICD-10-CM

## 2024-03-07 DIAGNOSIS — M79.644 BILATERAL THUMB PAIN: ICD-10-CM

## 2024-03-07 DIAGNOSIS — I65.02 OCCLUSION OF LEFT VERTEBRAL ARTERY: ICD-10-CM

## 2024-03-07 DIAGNOSIS — M25.532 BILATERAL WRIST PAIN: ICD-10-CM

## 2024-03-07 DIAGNOSIS — F17.210 CIGARETTE NICOTINE DEPENDENCE WITHOUT COMPLICATION: ICD-10-CM

## 2024-03-07 DIAGNOSIS — Z12.5 SCREENING FOR PROSTATE CANCER: ICD-10-CM

## 2024-03-07 DIAGNOSIS — E55.9 VITAMIN D DEFICIENCY: ICD-10-CM

## 2024-03-07 DIAGNOSIS — E78.1 HYPERTRIGLYCERIDEMIA: ICD-10-CM

## 2024-03-07 DIAGNOSIS — M79.645 BILATERAL THUMB PAIN: ICD-10-CM

## 2024-03-07 LAB
25(OH)D3 SERPL-MCNC: 39.6 NG/ML (ref 30–100)
ALBUMIN SERPL BCP-MCNC: 4.4 G/DL (ref 3.5–5)
ALP SERPL-CCNC: 70 U/L (ref 34–104)
ALT SERPL W P-5'-P-CCNC: 21 U/L (ref 7–52)
ANION GAP SERPL CALCULATED.3IONS-SCNC: 5 MMOL/L
AST SERPL W P-5'-P-CCNC: 18 U/L (ref 13–39)
BASOPHILS # BLD AUTO: 0.06 THOUSANDS/ÂΜL (ref 0–0.1)
BASOPHILS NFR BLD AUTO: 1 % (ref 0–1)
BILIRUB SERPL-MCNC: 0.54 MG/DL (ref 0.2–1)
BUN SERPL-MCNC: 19 MG/DL (ref 5–25)
CALCIUM SERPL-MCNC: 9.6 MG/DL (ref 8.4–10.2)
CHLORIDE SERPL-SCNC: 101 MMOL/L (ref 96–108)
CHOLEST SERPL-MCNC: 177 MG/DL
CO2 SERPL-SCNC: 32 MMOL/L (ref 21–32)
CREAT SERPL-MCNC: 0.84 MG/DL (ref 0.6–1.3)
EOSINOPHIL # BLD AUTO: 0.19 THOUSAND/ÂΜL (ref 0–0.61)
EOSINOPHIL NFR BLD AUTO: 3 % (ref 0–6)
ERYTHROCYTE [DISTWIDTH] IN BLOOD BY AUTOMATED COUNT: 12.9 % (ref 11.6–15.1)
EST. AVERAGE GLUCOSE BLD GHB EST-MCNC: 137 MG/DL
GFR SERPL CREATININE-BSD FRML MDRD: 95 ML/MIN/1.73SQ M
GLUCOSE P FAST SERPL-MCNC: 112 MG/DL (ref 65–99)
HBA1C MFR BLD: 6.4 %
HCT VFR BLD AUTO: 45.2 % (ref 36.5–49.3)
HDLC SERPL-MCNC: 78 MG/DL
HGB BLD-MCNC: 15 G/DL (ref 12–17)
IMM GRANULOCYTES # BLD AUTO: 0.02 THOUSAND/UL (ref 0–0.2)
IMM GRANULOCYTES NFR BLD AUTO: 0 % (ref 0–2)
LDLC SERPL CALC-MCNC: 84 MG/DL (ref 0–100)
LYMPHOCYTES # BLD AUTO: 1.34 THOUSANDS/ÂΜL (ref 0.6–4.47)
LYMPHOCYTES NFR BLD AUTO: 22 % (ref 14–44)
MCH RBC QN AUTO: 31.5 PG (ref 26.8–34.3)
MCHC RBC AUTO-ENTMCNC: 33.2 G/DL (ref 31.4–37.4)
MCV RBC AUTO: 95 FL (ref 82–98)
MONOCYTES # BLD AUTO: 0.58 THOUSAND/ÂΜL (ref 0.17–1.22)
MONOCYTES NFR BLD AUTO: 10 % (ref 4–12)
NEUTROPHILS # BLD AUTO: 3.9 THOUSANDS/ÂΜL (ref 1.85–7.62)
NEUTS SEG NFR BLD AUTO: 64 % (ref 43–75)
NONHDLC SERPL-MCNC: 99 MG/DL
NRBC BLD AUTO-RTO: 0 /100 WBCS
PLATELET # BLD AUTO: 255 THOUSANDS/UL (ref 149–390)
PMV BLD AUTO: 10 FL (ref 8.9–12.7)
POTASSIUM SERPL-SCNC: 5.2 MMOL/L (ref 3.5–5.3)
PROT SERPL-MCNC: 6.9 G/DL (ref 6.4–8.4)
PSA SERPL-MCNC: 0.3 NG/ML (ref 0–4)
RBC # BLD AUTO: 4.76 MILLION/UL (ref 3.88–5.62)
SODIUM SERPL-SCNC: 138 MMOL/L (ref 135–147)
TRIGL SERPL-MCNC: 73 MG/DL
WBC # BLD AUTO: 6.09 THOUSAND/UL (ref 4.31–10.16)

## 2024-03-07 PROCEDURE — 73110 X-RAY EXAM OF WRIST: CPT

## 2024-03-07 PROCEDURE — 80061 LIPID PANEL: CPT

## 2024-03-07 PROCEDURE — 85025 COMPLETE CBC W/AUTO DIFF WBC: CPT

## 2024-03-07 PROCEDURE — G0103 PSA SCREENING: HCPCS

## 2024-03-07 PROCEDURE — 73140 X-RAY EXAM OF FINGER(S): CPT

## 2024-03-07 PROCEDURE — 80053 COMPREHEN METABOLIC PANEL: CPT

## 2024-03-07 PROCEDURE — 83036 HEMOGLOBIN GLYCOSYLATED A1C: CPT

## 2024-03-07 PROCEDURE — 82306 VITAMIN D 25 HYDROXY: CPT

## 2024-03-07 PROCEDURE — 36415 COLL VENOUS BLD VENIPUNCTURE: CPT

## 2024-04-03 NOTE — PROGRESS NOTES
"Jeff Coyle 1965 male MRN: [id number]      ASSESSMENT/PLAN  Problem List Items Addressed This Visit        Behavioral Health    Anxiety - Primary    Relevant Orders    Ambulatory referral to Psych Services     Reviewed various options for management of poorly controlled stress/anxiety including therapy and medication. Pt agreeable to therapy -- refer to in office LCSW. Pt states he has thoughts of not wanting to deal with this anymore, but denies any active SI plan or attempts as he would not \"do that\" to his children. Reviewed calling 911/going to ED if these thoughts occur.         Future Appointments   Date Time Provider Department Center   5/9/2024  8:30 AM Audrey Cruz [facility]   2/24/2025  8:00 AM DO SHIMON Perez [facility]          SUBJECTIVE  CC: Anxiety (Patient seen in office today for a c/o having anxiety - can't sleep, feels like he is going to jump out of his skin. )      HPI:  Jeff Coyle is a 58 y.o. male who presents due to anxiety.     Pt states he used to be able to manage stress. However, 4 years ago he developed worsening anxiety around the time he and his wife  -- he has been trying to work on his marriage, but notes that she is not interested in counseling. He mentions that she has spent large amounts of money. He had to sell his house. He states not knowing what she is doing is \"tearing me up\"; she's \"taking taking taking\". He knows certain things set him off and he has become \"verbal\" because of him. He does currently have a PFA against him for 60 days because of this.   \"Things aren't going good\"    \"The inside of my body just twitches\"   \"I can't sleep\"  \"Wound tight\"   \"I feel like exploding\"   \"I don't want to be at work\", wants to be at home where he feels \"safe\"     Of note, pt does have history of inpatient behavioral health treatment.     Review of Systems   Psychiatric/Behavioral:  Positive for sleep disturbance. The patient is " "nervous/anxious.        Historical Information   The patient history was reviewed and updated as follows:    Past Medical History:   Diagnosis Date   • GREGOR (acute kidney injury) (HCC)    • Alcohol withdrawal (HCC)    • Arthritis    • Cat bite    • Closed fracture of triquetral bone of wrist    • Colon polyp    • Concussion     Last Assessed: 1/23/2017   • COVID-19 11/29/2022   • Folliculitis    • H/o Lyme disease    • H/O opioid abuse (HCC)    • Heart murmur    • Joint pain    • Lyme disease    • Seizure (HCC)     Last Assessed 1/23/2017   • Uncomplicated opioid dependence (HCC)    • Wears glasses     \"cheaters\"      Past Surgical History:   Procedure Laterality Date   • BACK SURGERY      lumabr herniated disc repair   • LUMBAR DISCECTOMY  03/13/2014     Right L4-L5 minimslly invasive microdiscectomy for decompression with METRx system. Operating room microscope for microdissection. Right L4-L5 epidursl steroid injection with 40 mg of Depo-Medrol     • OTHER SURGICAL HISTORY  2013    lymph node removed d/t cat bite    • TX COLONOSCOPY FLX DX W/COLLJ SPEC WHEN PFRMD N/A 10/30/2017    Procedure: COLONOSCOPY;  Surgeon: Candelario Tobias III, MD;  Location: [facility];  Service: Gastroenterology     Family History   Problem Relation Age of Onset   • Anxiety disorder Mother    • Other Mother         Back Disorder    • Depression Mother    • Emphysema Mother    • Heart attack Father    • Other Father         Back Disorder    • Diabetes Father    • Hypertension Father    • Diabetes Sister    • Diabetes Sister       Social History   Social History     Substance and Sexual Activity   Alcohol Use Not Currently    Comment: 3 beers per day     Social History     Substance and Sexual Activity   Drug Use No     Social History     Tobacco Use   Smoking Status Every Day   • Current packs/day: 2.00   • Average packs/day: 2.0 packs/day for 17.0 years (34.0 ttl pk-yrs)   • Types: Cigarettes   Smokeless Tobacco Never       Medications: " "    Current Outpatient Medications:   •  aspirin (ASPIRIN LOW DOSE) 81 mg EC tablet, Take 1 tablet (81 mg total) by mouth daily, Disp: 120 tablet, Rfl: 0  •  Biotin 1000 MCG CHEW, Chew 1 tablet daily, Disp: , Rfl:   •  cholecalciferol (VITAMIN D3) 1,000 units tablet, Take 5000 IU daily, Disp: 90 tablet, Rfl: 0  •  Cranberry-Vitamin C-Vitamin E 4200-20-3 MG-MG-UNIT CAPS, Take 1 capsule by mouth in the morning, Disp: , Rfl:   •  Garlic 1000 MG CAPS, Take 1 capsule by mouth in the morning, Disp: , Rfl:   •  Turmeric 500 MG TABS, Take 1 tablet by mouth in the morning, Disp: , Rfl:   •  Zinc 50 MG TABS, Take 1 tablet by mouth daily, Disp: , Rfl:   No Known Allergies    OBJECTIVE    Vitals:   Vitals:    04/04/24 1308   BP: 130/84   BP Location: Left arm   Patient Position: Sitting   Cuff Size: Large   Pulse: 82   Temp: (!) 96.7 °F (35.9 °C)   SpO2: 98%   Weight: 92.1 kg (203 lb)   Height: 5' 11\" (1.803 m)           Physical Exam  Vitals and nursing note reviewed.   Constitutional:       General: He is not in acute distress.     Appearance: Normal appearance.   HENT:      Head: Normocephalic and atraumatic.   Pulmonary:      Effort: Pulmonary effort is normal. No respiratory distress.   Neurological:      Mental Status: He is alert.      Comments: Grossly intact    Psychiatric:         Mood and Affect: Angry: frustrated/agitated.                    Rebeka Vazquez DO  [facility]   4/4/2024  1:29 PM    "

## 2024-04-04 ENCOUNTER — OFFICE VISIT (OUTPATIENT)
Dept: FAMILY MEDICINE CLINIC | Facility: CLINIC | Age: 59
End: 2024-04-04
Payer: COMMERCIAL

## 2024-04-04 VITALS
HEART RATE: 82 BPM | DIASTOLIC BLOOD PRESSURE: 84 MMHG | WEIGHT: 203 LBS | HEIGHT: 71 IN | OXYGEN SATURATION: 98 % | BODY MASS INDEX: 28.42 KG/M2 | SYSTOLIC BLOOD PRESSURE: 130 MMHG | TEMPERATURE: 96.7 F

## 2024-04-04 DIAGNOSIS — F41.9 ANXIETY: Primary | ICD-10-CM

## 2024-04-04 PROCEDURE — 99214 OFFICE O/P EST MOD 30 MIN: CPT | Performed by: FAMILY MEDICINE

## 2024-04-05 ENCOUNTER — TELEPHONE (OUTPATIENT)
Dept: FAMILY MEDICINE CLINIC | Facility: CLINIC | Age: 59
End: 2024-04-05

## 2024-04-05 NOTE — TELEPHONE ENCOUNTER
Renetta asked me to set up appt for patient. He was referred by Dr Vazquez to her. She had an opening at 130 at [place] location on Monday @ 1:30. I tried to contact pt to schedule him but no answer at [phone]. I left a message to call us or [place] office to schedule appt for Monday if still available or set up next available appt with Renetta

## 2024-05-09 ENCOUNTER — TELEPHONE (OUTPATIENT)
Dept: BEHAVIORAL/MENTAL HEALTH CLINIC | Facility: CLINIC | Age: 59
End: 2024-05-09

## 2024-05-09 NOTE — TELEPHONE ENCOUNTER
Call placed to the patient who failed to show up for 8:30 initial assessment leaving a message with the number for him to call to reschedule.

## 2025-02-21 NOTE — ASSESSMENT & PLAN NOTE
Update lipids, continue lifestyle management   Orders:  •  CBC and differential; Future  •  Comprehensive metabolic panel; Future  •  Lipid panel; Future

## 2025-02-21 NOTE — ASSESSMENT & PLAN NOTE
In pre-contemplative stage of cessation, will continue to monitor   Orders:  •  CBC and differential; Future  •  CT lung screening program; Future

## 2025-02-21 NOTE — ASSESSMENT & PLAN NOTE
Update A1c, continue lifestyle management   Orders:  •  CBC and differential; Future  •  Comprehensive metabolic panel; Future  •  Lipid panel; Future  •  Hemoglobin A1C; Future

## 2025-02-21 NOTE — PROGRESS NOTES
"Name: Jeff Coyle      : 1965      MRN: [id number]  Encounter Provider: Rebeka Vazquez DO  Encounter Date: 2025   Encounter department: [facility]  :  Assessment & Plan  Prediabetes  Update A1c, continue lifestyle management   Orders:  •  CBC and differential; Future  •  Comprehensive metabolic panel; Future  •  Lipid panel; Future  •  Hemoglobin A1C; Future    Hypertriglyceridemia  Update lipids, continue lifestyle management   Orders:  •  CBC and differential; Future  •  Comprehensive metabolic panel; Future  •  Lipid panel; Future    Cigarette nicotine dependence without complication  In pre-contemplative stage of cessation, will continue to monitor   Orders:  •  CBC and differential; Future  •  CT lung screening program; Future    Bilateral hand pain  Will refer to Ortho to discuss further management including possible injections   Orders:  •  Ambulatory referral to Orthopedic Surgery; Future    Bilateral wrist pain  See hand pain   Orders:  •  Ambulatory referral to Orthopedic Surgery; Future    Vitamin D deficiency    Orders:  •  Vitamin D 25 hydroxy; Future    Blood in stool  Will refer to GI to discuss earlier repeat colonoscopy    Orders:  •  Ambulatory referral to Gastroenterology; Future    Healthcare maintenance  BP WNL   CRC UTD   PSA DUE -- ordered   Lung CA DUE -- reviewed low dose CT, f/u dependent on results -- pt agreeable, encouraged to schedule   Vaccinations: Pneumo UTD, TDap UTD, Shingles given, Flu defers, COVID defers  Encouraged regular physical activity, varied diet, and regular dental/eye exams          Screening for prostate cancer    Orders:  •  PSA, Total Screen; Future    Encounter for immunization           History of Present Illness   HPI    Pt presents for chronic follow up, annual physical     Pre-DM/HLD: \"I work every day all day\" so very active, but doesn't have a great diet   Tobacco Use: 1.5 pack per day \"at least\"       Review of Systems " "  Constitutional:  Negative for unexpected weight change.   HENT:  Positive for rhinorrhea and sneezing. Negative for congestion, ear pain and sore throat.    Eyes:  Positive for visual disturbance (blurred).   Respiratory:  Negative for cough and shortness of breath.    Cardiovascular:  Negative for chest pain, palpitations and leg swelling.   Gastrointestinal:  Positive for blood in stool. Negative for abdominal pain, constipation and diarrhea.   Endocrine: Negative for polyuria.   Genitourinary:  Positive for frequency (over the past few years). Negative for dysuria and hematuria.   Neurological:  Positive for numbness (in his feet for years). Negative for dizziness and headaches.   Psychiatric/Behavioral:  Negative for sleep disturbance (3-4 hours/night, chronic/unchanged).        Objective   /76   Temp (!) 97.3 °F (36.3 °C)   Ht 5' 11\" (1.803 m)   Wt 90.3 kg (199 lb)   BMI 27.75 kg/m²      Physical Exam  Vitals and nursing note reviewed.   Constitutional:       General: He is not in acute distress.     Appearance: Normal appearance. He is well-developed.   HENT:      Head: Normocephalic and atraumatic.      Right Ear: Tympanic membrane, ear canal and external ear normal.      Left Ear: Tympanic membrane, ear canal and external ear normal.      Nose: Nose normal. No rhinorrhea.      Mouth/Throat:      Mouth: Mucous membranes are moist.      Pharynx: No oropharyngeal exudate or posterior oropharyngeal erythema.   Eyes:      Conjunctiva/sclera: Conjunctivae normal.   Neck:      Thyroid: No thyromegaly.   Cardiovascular:      Rate and Rhythm: Normal rate and regular rhythm.   Pulmonary:      Effort: Pulmonary effort is normal. No respiratory distress.      Breath sounds: Normal breath sounds.   Abdominal:      General: Bowel sounds are normal. There is no distension.      Palpations: Abdomen is soft.      Tenderness: There is no abdominal tenderness.   Musculoskeletal:      Right lower leg: No edema.      " Left lower leg: No edema.   Lymphadenopathy:      Cervical: No cervical adenopathy.   Skin:     General: Skin is warm and dry.   Neurological:      Mental Status: He is alert.      Comments: Grossly intact   Psychiatric:         Mood and Affect: Mood normal.

## 2025-02-24 ENCOUNTER — OFFICE VISIT (OUTPATIENT)
Dept: FAMILY MEDICINE CLINIC | Facility: CLINIC | Age: 60
End: 2025-02-24
Payer: COMMERCIAL

## 2025-02-24 VITALS
TEMPERATURE: 97.3 F | BODY MASS INDEX: 27.86 KG/M2 | WEIGHT: 199 LBS | HEIGHT: 71 IN | SYSTOLIC BLOOD PRESSURE: 120 MMHG | DIASTOLIC BLOOD PRESSURE: 76 MMHG

## 2025-02-24 DIAGNOSIS — R73.03 PREDIABETES: Primary | ICD-10-CM

## 2025-02-24 DIAGNOSIS — Z00.00 HEALTHCARE MAINTENANCE: ICD-10-CM

## 2025-02-24 DIAGNOSIS — Z23 ENCOUNTER FOR IMMUNIZATION: ICD-10-CM

## 2025-02-24 DIAGNOSIS — M25.531 BILATERAL WRIST PAIN: ICD-10-CM

## 2025-02-24 DIAGNOSIS — Z12.5 SCREENING FOR PROSTATE CANCER: ICD-10-CM

## 2025-02-24 DIAGNOSIS — E55.9 VITAMIN D DEFICIENCY: ICD-10-CM

## 2025-02-24 DIAGNOSIS — F17.210 CIGARETTE NICOTINE DEPENDENCE WITHOUT COMPLICATION: ICD-10-CM

## 2025-02-24 DIAGNOSIS — M25.532 BILATERAL WRIST PAIN: ICD-10-CM

## 2025-02-24 DIAGNOSIS — M79.642 BILATERAL HAND PAIN: ICD-10-CM

## 2025-02-24 DIAGNOSIS — K92.1 BLOOD IN STOOL: ICD-10-CM

## 2025-02-24 DIAGNOSIS — E78.1 HYPERTRIGLYCERIDEMIA: ICD-10-CM

## 2025-02-24 DIAGNOSIS — M79.641 BILATERAL HAND PAIN: ICD-10-CM

## 2025-02-24 PROCEDURE — 90750 HZV VACC RECOMBINANT IM: CPT

## 2025-02-24 PROCEDURE — 99214 OFFICE O/P EST MOD 30 MIN: CPT | Performed by: FAMILY MEDICINE

## 2025-02-24 PROCEDURE — 90471 IMMUNIZATION ADMIN: CPT

## 2025-02-24 PROCEDURE — 99396 PREV VISIT EST AGE 40-64: CPT | Performed by: FAMILY MEDICINE

## 2025-04-20 ENCOUNTER — HOSPITAL ENCOUNTER (OUTPATIENT)
Dept: CT IMAGING | Facility: HOSPITAL | Age: 60
Discharge: HOME | End: 2025-04-20
Attending: FAMILY MEDICINE

## 2025-04-20 DIAGNOSIS — F17.210 CIGARETTE NICOTINE DEPENDENCE WITHOUT COMPLICATION: ICD-10-CM

## 2025-04-25 ENCOUNTER — RESULTS FOLLOW-UP (OUTPATIENT)
Dept: FAMILY MEDICINE CLINIC | Facility: CLINIC | Age: 60
End: 2025-04-25

## 2025-04-28 DIAGNOSIS — I25.10 CORONARY ARTERY DISEASE INVOLVING NATIVE CORONARY ARTERY OF NATIVE HEART WITHOUT ANGINA PECTORIS: Primary | ICD-10-CM

## 2025-04-28 NOTE — TELEPHONE ENCOUNTER
Patient called back, read message. Patient states he would like to have the referral for cardiology and to be notified once placed